# Patient Record
Sex: MALE | Race: WHITE | Employment: OTHER | RURAL
[De-identification: names, ages, dates, MRNs, and addresses within clinical notes are randomized per-mention and may not be internally consistent; named-entity substitution may affect disease eponyms.]

---

## 2017-01-01 ENCOUNTER — OFFICE VISIT (OUTPATIENT)
Dept: FAMILY MEDICINE CLINIC | Age: 82
End: 2017-01-01

## 2017-01-01 ENCOUNTER — TELEPHONE (OUTPATIENT)
Dept: FAMILY MEDICINE CLINIC | Age: 82
End: 2017-01-01

## 2017-01-01 VITALS
BODY MASS INDEX: 23.96 KG/M2 | DIASTOLIC BLOOD PRESSURE: 70 MMHG | HEART RATE: 73 BPM | OXYGEN SATURATION: 95 % | SYSTOLIC BLOOD PRESSURE: 146 MMHG | RESPIRATION RATE: 20 BRPM | WEIGHT: 167 LBS

## 2017-01-01 VITALS
DIASTOLIC BLOOD PRESSURE: 60 MMHG | HEART RATE: 74 BPM | SYSTOLIC BLOOD PRESSURE: 142 MMHG | OXYGEN SATURATION: 94 % | RESPIRATION RATE: 16 BRPM

## 2017-01-01 VITALS
WEIGHT: 170 LBS | DIASTOLIC BLOOD PRESSURE: 78 MMHG | OXYGEN SATURATION: 96 % | SYSTOLIC BLOOD PRESSURE: 138 MMHG | RESPIRATION RATE: 16 BRPM | BODY MASS INDEX: 24.39 KG/M2 | HEART RATE: 60 BPM

## 2017-01-01 VITALS
OXYGEN SATURATION: 95 % | BODY MASS INDEX: 25.97 KG/M2 | RESPIRATION RATE: 20 BRPM | WEIGHT: 181 LBS | SYSTOLIC BLOOD PRESSURE: 142 MMHG | DIASTOLIC BLOOD PRESSURE: 70 MMHG | HEART RATE: 55 BPM

## 2017-01-01 VITALS
SYSTOLIC BLOOD PRESSURE: 130 MMHG | OXYGEN SATURATION: 96 % | HEART RATE: 134 BPM | BODY MASS INDEX: 26.03 KG/M2 | RESPIRATION RATE: 20 BRPM | DIASTOLIC BLOOD PRESSURE: 78 MMHG | WEIGHT: 181.4 LBS

## 2017-01-01 VITALS
DIASTOLIC BLOOD PRESSURE: 64 MMHG | HEART RATE: 94 BPM | SYSTOLIC BLOOD PRESSURE: 118 MMHG | OXYGEN SATURATION: 98 % | RESPIRATION RATE: 16 BRPM

## 2017-01-01 VITALS
HEART RATE: 86 BPM | OXYGEN SATURATION: 98 % | DIASTOLIC BLOOD PRESSURE: 86 MMHG | SYSTOLIC BLOOD PRESSURE: 132 MMHG | RESPIRATION RATE: 19 BRPM

## 2017-01-01 DIAGNOSIS — I48.20 CHRONIC ATRIAL FIBRILLATION (HCC): Primary | ICD-10-CM

## 2017-01-01 DIAGNOSIS — L57.0 ACTINIC KERATOSIS: ICD-10-CM

## 2017-01-01 DIAGNOSIS — Z23 ENCOUNTER FOR IMMUNIZATION: ICD-10-CM

## 2017-01-01 DIAGNOSIS — I10 ESSENTIAL HYPERTENSION: ICD-10-CM

## 2017-01-01 DIAGNOSIS — G71.00 MUSCULAR DYSTROPHY (HCC): ICD-10-CM

## 2017-01-01 DIAGNOSIS — R06.89 HYPERCAPNIA: ICD-10-CM

## 2017-01-01 DIAGNOSIS — Z00.00 MEDICARE ANNUAL WELLNESS VISIT, SUBSEQUENT: Primary | ICD-10-CM

## 2017-01-01 DIAGNOSIS — I48.20 CHRONIC ATRIAL FIBRILLATION (HCC): ICD-10-CM

## 2017-01-01 LAB
INR BLD: 2.2
INR BLD: 2.8
INR BLD: 3
INR BLD: 3.9
INR BLD: 4.2
INR BLD: 4.9
PT POC: 26.1 SECONDS
PT POC: 33.6 SECONDS
PT POC: 36.2 SEC
PT POC: 46.6 SECONDS
PT POC: 50.6 SEC
PT POC: 58.3 SECONDS
VALID INTERNAL CONTROL?: YES

## 2017-01-01 RX ORDER — IPRATROPIUM BROMIDE 42 UG/1
2 SPRAY, METERED NASAL 4 TIMES DAILY
Qty: 15 ML | Refills: 24 | Status: SHIPPED | OUTPATIENT
Start: 2017-01-01

## 2017-01-01 RX ORDER — LANOLIN ALCOHOL/MO/W.PET/CERES
1000 CREAM (GRAM) TOPICAL DAILY
COMMUNITY

## 2017-01-01 RX ORDER — VERAPAMIL HYDROCHLORIDE 180 MG/1
180 TABLET, EXTENDED RELEASE ORAL DAILY
Qty: 90 TAB | Refills: 3 | Status: SHIPPED | OUTPATIENT
Start: 2017-01-01

## 2017-01-01 RX ORDER — IPRATROPIUM BROMIDE 42 UG/1
2 SPRAY, METERED NASAL 4 TIMES DAILY
Qty: 15 ML | Refills: 11 | Status: SHIPPED | OUTPATIENT
Start: 2017-01-01 | End: 2017-01-01 | Stop reason: SDUPTHER

## 2017-01-12 ENCOUNTER — TELEPHONE (OUTPATIENT)
Dept: FAMILY MEDICINE CLINIC | Age: 82
End: 2017-01-12

## 2017-03-10 NOTE — ACP (ADVANCE CARE PLANNING)
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 He has an AD and a LW. In the event that he is unable to speak for himself, he requests that we contact [address] [place] [address] Yuki at [phone].       Ryann Jackman

## 2017-03-10 NOTE — PROGRESS NOTES
Chief Complaint   Patient presents with    Annual Wellness Visit    Anticoagulation     INR Check up     Knee Pain     left knee pain wants to discuss. HPI:      Ac Chen is a 80 y.o. male. Jaz Ferrer is retired from the [employer] at [employer]. He is  and has 2 adult children. 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 He also has chronic atrial fibrillation and is rate controlled with Verapamil and anticoagulated with Warfarin. New Issues:  Due for SAWV    No Known Allergies    Current Outpatient Prescriptions   Medication Sig    azithromycin (ZITHROMAX) 250 mg tablet Take 1 Tab by mouth daily.  furosemide (LASIX) 40 mg tablet Take 1 Tab by mouth every Monday, Wednesday, Friday.  potassium chloride (K-DUR, KLOR-CON) 10 mEq tablet Take 1 Tab by mouth every Monday, Wednesday, Friday.  ipratropium (ATROVENT) 0.06 % nasal spray USE TWO SPRAY(S) IN EACH NOSTRIL 4 TIMES DAILY    verapamil ER (CALAN-SR) 180 mg CR tablet Take 180 mg by mouth daily.  warfarin (COUMADIN) 2.5 mg tablet Take 2.5 mg by mouth six (6) days a week. Indications: 1/2 tablet on Sunday     No current facility-administered medications for this visit. Past Medical History:   Diagnosis Date    Atrial fibrillation (HCC)     CO2 narcosis     uses CPAP    Hypercapnia     Hypertension     LBBB (left bundle branch block)     Macular degeneration     Muscular dystrophy (HCC)     limb-girdle    Pernicious anemia     B-12 def         ROS:  Denies fever, chills, cough, chest pain, SOB,  nausea, vomiting, or diarrhea. Denies wt loss, wt gain, hemoptysis, hematochezia or melena.     Physical Examination:    Visit Vitals    /70 (BP 1 Location: Right arm, BP Patient Position: Sitting)    Pulse (!) 55    Resp 20    Wt 181 lb (82.1 kg)    SpO2 95%    BMI 25.97 kg/m2     General: Alert and Ox3, Breathless, low volume speech, halting (this is chronic)  HEENT:  NC/AT, EOMI, OP: clear  Neck:  Supple, no adenopathy, JVD, mass or bruit  Chest:  Clear to Ausculation, without wheezes, rales, rubs or ronchi  Cardiac: IRRR  Abdomen:  +BS, soft, nontender without palpable HSM  Extremities:  No cyanosis, clubbing or edema  Neurologic:  Ambulatory without assist, CN 2-12 grossly intact. Moves all extremities. Skin: no rash  Lymphadenopathy: no cervical or supraclavicular nodes    Lab Results   Component Value Date/Time    INR, External 1.8 06/05/2015 10:28 AM    INR POC 4.2 03/10/2017 10:54 AM    INR POC 2.9 12/28/2016 10:13 AM    INR POC 3.1 10/21/2016 11:13 AM         ASSESSMENT AND PLAN:     1.  AF:  supratherapeutically anticoagulated. Will hold warfarin for 2 days and then resume and he will check at home next week. New warfarin dose:  12 mg per WEEK. 2.  Due for Wellness. 3.  RTC in 2 weeks    Orders Placed This Encounter    COLLECTION CAPILLARY BLOOD SPECIMEN    AMB POC PT/INR       [place] MD, FACP        ______________________________________________________________________    Vero Fisher is a 80 y.o. male and presents for annual Medicare Wellness Visit. Problem List: Reviewed with patient and discussed risk factors. Patient Active Problem List   Diagnosis Code    Atrial fibrillation (HCC) I48.91    Hypercapnia R06.89    CO2 narcosis R06.89    Muscular dystrophy ([facility] [address] G71.0    Macular degeneration H35.30    Pernicious anemia D51.0    Hypertension I10    Primary osteoarthritis of left knee M17.12       Current medical providers:  Patient Care Team:  Yessica Patricio MD as PCP - General (Internal Medicine)    PM, , Medications/Allergies: reviewed, on chart. Male Alcohol Screening: On any occasion during the past 3 months, have you had more than 4 drinks containing alcohol? No    Do you average more than 14 drinks per week?   No    ROS:  Constitutional: No fever, chills or weight loss  Respiratory: No cough, SOB   CV: No chest pain or Palpitations    Objective:  Visit Vitals    /70 (BP 1 Location: Right arm, BP Patient Position: Sitting)    Pulse (!) 55    Resp 20    Wt 181 lb (82.1 kg)    SpO2 95%    BMI 25.97 kg/m2    Body mass index is 25.97 kg/(m^2). Assessment of cognitive impairment: Alert and oriented x 3    Depression Screen:   PHQ 2 / 9, over the last two weeks 3/10/2017   Little interest or pleasure in doing things Not at all   Feeling down, depressed or hopeless Not at all   Total Score PHQ 2 0       Fall Risk Assessment:    Fall Risk Assessment, last 12 mths 3/10/2017   Able to walk? Yes   Fall in past 12 months? No       Functional Ability:   Does the patient exhibit a steady gait? yes   How long did it take the patient to get up and walk from a sitting position? 12 sec   Is the patient self reliant?  (ie can do own laundry, meals, household chores)  yes     Does the patient handle his/her own medications? yes     Does the patient handle his/her own money? yes     Is the patients home safe (ie good lighting, handrails on stairs and bath, etc.)? yes     Did you notice or did patient express any hearing difficulties? yes     Did you notice or did patient express any vision difficulties? no       Advance Care Planning:   Patient was offered the opportunity to discuss advance care planning:  yes     Does patient have an Advance Directive:  yes   If no, did you provide information on Caring Connections?  no       Plan:      Orders Placed This Encounter    COLLECTION CAPILLARY BLOOD SPECIMEN    AMB POC PT/INR       Health Maintenance   Topic Date Due    DTaP/Tdap/Td series (1 - Tdap) 06/08/1956    GLAUCOMA SCREENING Q2Y  06/08/2000    MEDICARE YEARLY EXAM  01/26/2017    ZOSTER VACCINE AGE 60>  Completed    Pneumococcal 65+ Low/Medium Risk  Completed    INFLUENZA AGE 9 TO ADULT  Completed       *Patient verbalized understanding and agreement with the plan.   A copy of the After Visit Summary with personalized health plan was given to the patient today.

## 2017-03-10 NOTE — MR AVS SNAPSHOT
Visit Information Date & Time Provider Department Dept. Phone Encounter #  
 3/10/2017 10:30 AM Alessandra Ny MD [facility] [id number] Follow-up Instructions Return if symptoms worsen or fail to improve. Upcoming Health Maintenance Date Due DTaP/Tdap/Td series (1 - Tdap) 6[birth year]8/1956 GLAUCOMA SCREENING Q2Y 6/8/2000 MEDICARE YEARLY EXAM 1/26/2017 Allergies as of 3/10/2017  Review Complete On: 3/10/2017 By: Alessandra Ny MD  
 No Known Allergies Current Immunizations  Never Reviewed Name Date Influenza High Dose Vaccine PF 10/21/2016 11:39 AM  
 Influenza Vaccine 11/20/2015 10:33 AM, 10/22/2014 Pneumococcal Conjugate (PCV-13) 1/26/2016 10:37 AM  
 Pneumococcal Vaccine (Unspecified Type) 5/5/2010 Varicella Virus Vaccine 4/25/2012 Not reviewed this visit You Were Diagnosed With   
  
 Codes Comments Medicare annual wellness visit, subsequent    -  Primary ICD-10-CM: Z00.00 ICD-9-CM: V70.0 Chronic atrial fibrillation (HCC)     ICD-10-CM: F47.8 ICD-9-CM: 427.31 Muscular dystrophy ([facility] [address]     ICD-10-CM: G71.0 ICD-9-CM: 359.1 Vitals BP Pulse Resp Weight(growth percentile) SpO2 BMI  
 142/70 (BP 1 Location: Right arm, BP Patient Position: Sitting) (!) 55 20 181 lb (82.1 kg) 95% 25.97 kg/m2 Smoking Status Former Smoker Vitals History BMI and BSA Data Body Mass Index Body Surface Area  
 25.97 kg/m 2 2.01 m 2 Preferred Pharmacy Pharmacy Name Phone [facility] [address] [phone] Your Updated Medication List  
  
   
This list is accurate as of: 3/10/17 11:30 AM.  Always use your most recent med list.  
  
  
  
  
 azithromycin 250 mg tablet Commonly known as:  Unice Abigail Take 1 Tab by mouth daily. furosemide 40 mg tablet Commonly known as:  LASIX Take 1 Tab by mouth every Monday, Wednesday, Friday. ipratropium 0.06 % nasal spray Commonly known as:  ATROVENT  
USE TWO SPRAY(S) IN EACH NOSTRIL 4 TIMES DAILY potassium chloride 10 mEq tablet Commonly known as:  K-DUR, KLOR-CON Take 1 Tab by mouth every Monday, Wednesday, Friday. verapamil  mg CR tablet Commonly known as:  CALAN-SR Take 180 mg by mouth daily. warfarin 2.5 mg tablet Commonly known as:  COUMADIN Take 2.5 mg by mouth six (6) days a week. Indications: 1/2 tablet on Sunday March 2017 Details Sun Mon Tue Wed Thu Fri Sat  
     1  
  
  
  
   2  
  
  
  
   3  
  
  
  
   4  
  
  
  
  
  5  
  
  
  
   6  
  
  
  
   7  
  
  
  
   8  
  
  
  
   9  
  
  
  
   10 4.2 See details 11  
  
  
  
  
  12  
  
  
  
   13  
  
  
  
   14  
  
  
  
   15  
  
  
  
   16  
  
  
  
   17  
  
  
  
   18  
  
  
  
  
  19  
  
  
  
   20  
  
  
  
   21  
  
  
  
   22  
  
  
  
   23  
  
  
  
   24  
  
  
  
   25  
  
  
  
  
  26  
  
  
  
   27  
  
  
  
   28  
  
  
  
   29  
  
  
  
   30  
  
  
  
   31  
  
  
  
   
 Date Details 03/10 This INR check INR: 4.2 Date of next INR: No date specified We Performed the Following AMB POC PT/INR [46262 CPT(R)] COLLECTION CAPILLARY BLOOD SPECIMEN [76624 CPT(R)] Follow-up Instructions Return if symptoms worsen or fail to improve. Introducing [address]! Kenya Blanca introduces [employer] patient portal. Now you can access parts of your medical record, email your doctor's office, and request medication refills online. 1. In your internet browser, go to [email or web address] 2. Click on the First Time User? Click Here link in the Sign In box. You will see the New Member Sign Up page. 3. Enter your LiveData Access Code exactly as it appears below.  You will not need to use this code after youve completed the sign-up process. If you do not sign up before the expiration date, you must request a new code. · [employer] Access Code: [email or web address] Expires: 6/8/2017 10:57 AM 
 
4. Enter the last four digits of your Social Security Number (xxxx) and Date of Birth (mm/dd/yyyy) as indicated and click Submit. You will be taken to the next sign-up page. 5. Create a Bridg ID. This will be your Bridg login ID and cannot be changed, so think of one that is secure and easy to remember. 6. Create a Bridg password. You can change your password at any time. 7. Enter your Password Reset Question and Answer. This can be used at a later time if you forget your password. 8. Enter your e-mail address. You will receive e-mail notification when new information is available in [address] 9. Click Sign Up. You can now view and download portions of your medical record. 10. Click the Download Summary menu link to download a portable copy of your medical information. If you have questions, please visit the Frequently Asked Questions section of the Bridg website. Remember, Bridg is NOT to be used for urgent needs. For medical emergencies, dial 911. Now available from your iPhone and Android! Please provide this summary of care documentation to your next provider. Your primary care clinician is listed as Tiffanie Crisostomo. If you have any questions after today's visit, please call [phone].

## 2017-03-10 NOTE — PROGRESS NOTES
Results for orders placed or performed in visit on 03/10/17   AMB POC PT/INR   Result Value Ref Range    VALID INTERNAL CONTROL POC Yes     Prothrombin time (POC) 50.6 sec    INR POC 4.2

## 2017-03-31 NOTE — PROGRESS NOTES
Chief Complaint   Patient presents with    Anticoagulation     INR check         HPI:       is a 80 y.o. male. Abena Gold is retired from the [employer] at [employer]. He is  and has 2 adult children. 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 He also has chronic atrial fibrillation and is rate controlled with Verapamil and anticoagulated with Warfarin. New Issues: Too thin last visit;  Due to have INR assessment today    No Known Allergies    Current Outpatient Prescriptions   Medication Sig    BETA-CAROTENE,A,-VITS C,E/MINS (VISION PO) Take  by mouth.  furosemide (LASIX) 40 mg tablet Take 1 Tab by mouth every Monday, Wednesday, Friday.  ipratropium (ATROVENT) 0.06 % nasal spray USE TWO SPRAY(S) IN EACH NOSTRIL 4 TIMES DAILY    verapamil ER (CALAN-SR) 180 mg CR tablet Take 180 mg by mouth daily.  warfarin (COUMADIN) 2.5 mg tablet Take 2.5 mg by mouth six (6) days a week. Indications: 1/2 tablet on Sunday     No current facility-administered medications for this visit. Past Medical History:   Diagnosis Date    Atrial fibrillation (HCC)     CO2 narcosis     uses CPAP    Hypercapnia     Hypertension     LBBB (left bundle branch block)     Macular degeneration     Muscular dystrophy (HCC)     limb-girdle    Pernicious anemia     B-12 def         ROS:  Denies fever, chills, cough, chest pain, SOB,  nausea, vomiting, or diarrhea. Denies wt loss, wt gain, hemoptysis, hematochezia or melena.     Physical Examination:    /78 (BP 1 Location: Right arm, BP Patient Position: Sitting)  Pulse (!) 134  Resp 20  Wt 181 lb 6.4 oz (82.3 kg)  SpO2 96%  BMI 26.03 kg/m2    General: Alert and Ox3, Breathless, low volume speech, halting (this is chronic)  HEENT: NC/AT, EOMI, OP: clear  Neck: Supple, no adenopathy, JVD, mass or bruit  Chest: Clear to Ausculation, without wheezes, rales, rubs or ronchi  Cardiac: IRRR  Abdomen: +BS, soft, nontender without palpable HSM  Extremities: No cyanosis, clubbing or edema  Neurologic: Ambulatory with a walker, CN 2-12 grossly intact. Moves all extremities. Skin: no rash  Lymphadenopathy: no cervical or supraclavicular nodes    Lab Results   Component Value Date/Time    INR, External 1.8 06/05/2015 10:28 AM    INR POC 3.0 03/31/2017 09:34 AM    INR POC 4.2 03/10/2017 10:54 AM    INR POC 2.9 12/28/2016 10:13 AM       ASSESSMENT AND PLAN:     1. He is therapeutically anticoagulated. No changes in Warfarin regimen. Currently taking Warfarin 2.5 mg MTFS, 1.25 mg WR and none on Sunday. 2.  CO2 narcosis: no recent sx  3. Well controlled HTN    Orders Placed This Encounter    COLLECTION CAPILLARY BLOOD SPECIMEN    AMB POC PT/INR    BETA-CAROTENE,A,-VITS C,E/MINS (VISION PO)     Sig: Take  by mouth.        Sarah Painting MD, [place]

## 2017-03-31 NOTE — MR AVS SNAPSHOT
Visit Information Date & Time Provider Department Dept. Phone Encounter #  
 3/31/2017  9:30 AM Sean Valdovinos MD [facility] [id number] Follow-up Instructions Return in about 2 months (around 5/31/2017). Follow-up and Disposition History Upcoming Health Maintenance Date Due DTaP/Tdap/Td series (1 - Tdap) 6[birth year]8/1956 GLAUCOMA SCREENING Q2Y 6/8/2000 MEDICARE YEARLY EXAM 3/11/2018 Allergies as of 3/31/2017  Review Complete On: 3/10/2017 By: Sean Valdovinos MD  
 No Known Allergies Current Immunizations  Never Reviewed Name Date Influenza High Dose Vaccine PF 10/21/2016 11:39 AM  
 Influenza Vaccine 11/20/2015 10:33 AM, 10/22/2014 Pneumococcal Conjugate (PCV-13) 1/26/2016 10:37 AM  
 Pneumococcal Vaccine (Unspecified Type) 5/5/2010 Varicella Virus Vaccine 4/25/2012 Not reviewed this visit You Were Diagnosed With   
  
 Codes Comments Chronic atrial fibrillation (HCC)    -  Primary ICD-10-CM: B03.7 ICD-9-CM: 427.31 Hypercapnia     ICD-10-CM: R06.89 
ICD-9-CM: 786.09 Essential hypertension     ICD-10-CM: I10 
ICD-9-CM: 401.9 Vitals BP Pulse Resp Weight(growth percentile) SpO2 BMI  
 130/78 (BP 1 Location: Right arm, BP Patient Position: Sitting) (!) 134 20 181 lb 6.4 oz (82.3 kg) 96% 26.03 kg/m2 Smoking Status Former Smoker Vitals History BMI and BSA Data Body Mass Index Body Surface Area 26.03 kg/m 2 2.02 m 2 Preferred Pharmacy Pharmacy Name Phone [facility] [place] [address] [phone] Your Updated Medication List  
  
   
This list is accurate as of: 3/31/17 10:03 AM.  Always use your most recent med list.  
  
  
  
  
 furosemide 40 mg tablet Commonly known as:  LASIX Take 1 Tab by mouth every Monday, Wednesday, Friday. ipratropium 0.06 % nasal spray Commonly known as:  ATROVENT  
USE TWO SPRAY(S) IN EACH NOSTRIL 4 TIMES DAILY  
  
 verapamil  mg CR tablet Commonly known as:  CALAN-SR Take 180 mg by mouth daily. VISION PO Take  by mouth.  
  
 warfarin 2.5 mg tablet Commonly known as:  COUMADIN Take 2.5 mg by mouth six (6) days a week. Indications: 1/2 tablet on Sunday We Performed the Following AMB POC PT/INR [55010 CPT(R)] COLLECTION CAPILLARY BLOOD SPECIMEN [64230 CPT(R)] Follow-up Instructions Return in about 2 months (around 5/31/2017). Introducing [facility]! Grisel Sethi introduces [employer] patient portal. Now you can access parts of your medical record, email your doctor's office, and request medication refills online. 1. In your internet browser, go to [email or web address] 2. Click on the First Time User? Click Here link in the Sign In box. You will see the New Member Sign Up page. 3. Enter your [employer] Access Code exactly as it appears below. You will not need to use this code after youve completed the sign-up process. If you do not sign up before the expiration date, you must request a new code. · [employer] Access Code: [email or web address] Expires: 6/8/2017 11:57 AM 
 
4. Enter the last four digits of your Social Security Number (xxxx) and Date of Birth (mm/dd/yyyy) as indicated and click Submit. You will be taken to the next sign-up page. 5. Create a Porcht ID. This will be your [employer] login ID and cannot be changed, so think of one that is secure and easy to remember. 6. Create a [employer] password. You can change your password at any time. 7. Enter your Password Reset Question and Answer. This can be used at a later time if you forget your password. 8. Enter your e-mail address. You will receive e-mail notification when new information is available in [address] 9. Click Sign Up. You can now view and download portions of your medical record. 10. Click the Download Summary menu link to download a portable copy of your medical information. If you have questions, please visit the Frequently Asked Questions section of the [employer] website. Remember, [employer] is NOT to be used for urgent needs. For medical emergencies, dial 911. Now available from your iPhone and Android! Please provide this summary of care documentation to your next provider. Your primary care clinician is listed as Blanquita Hicks. If you have any questions after today's visit, please call [phone].

## 2017-06-02 NOTE — PROGRESS NOTES
Chief Complaint   Patient presents with    Irregular Heart Beat         HPI:       is a 80 y.o. male. Kristina Tafoya is retired from the [employer] at [employer]. He is  and has 2 adult children. 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 He also has chronic atrial fibrillation and is rate controlled with Verapamil and anticoagulated with Warfarin. Recently has taken probiotics. No new meds, antibiotics, ETOH, or med deletions. No bleeding. No Known Allergies    Current Outpatient Prescriptions   Medication Sig    cyanocobalamin (VITAMIN B-12) 1,000 mcg tablet Take 1,000 mcg by mouth daily.  B.infantis-B.ani-B.long-B.bifi (PROBIOTIC 4X) 10-15 mg TbEC Take  by mouth.  ipratropium (ATROVENT) 0.06 % nasal spray USE TWO SPRAY(S) IN EACH NOSTRIL 4 TIMES DAILY    BETA-CAROTENE,A,-VITS C,E/MINS (VISION PO) Take  by mouth.  furosemide (LASIX) 40 mg tablet Take 1 Tab by mouth every Monday, Wednesday, Friday.  verapamil ER (CALAN-SR) 180 mg CR tablet Take 180 mg by mouth daily.  warfarin (COUMADIN) 2.5 mg tablet Take 2.5 mg by mouth six (6) days a week. Indications: 1/2 tablet on Sunday     No current facility-administered medications for this visit. Past Medical History:   Diagnosis Date    Atrial fibrillation (HCC)     CO2 narcosis     uses CPAP    Hypercapnia     Hypertension     LBBB (left bundle branch block)     Macular degeneration     Muscular dystrophy (HCC)     limb-girdle    Pernicious anemia     B-12 def         ROS:  Denies fever, chills, cough, chest pain, SOB,  nausea, vomiting, or diarrhea. Denies wt loss, wt gain, hemoptysis, hematochezia or melena.     Physical Examination:    /78 (BP 1 Location: Left arm, BP Patient Position: Sitting)  Pulse 60  Resp 16  Wt 170 lb (77.1 kg)  SpO2 96%  BMI 24.39 kg/m2    General: Alert and Ox3, Fluent speech  HEENT:  NC/AT, EOMI, OP: clear  Neck:  Supple, no adenopathy, JVD, mass or bruit  Chest:  Clear to Ausculation, without wheezes, rales, rubs or ronchi  Cardiac: IRRR  Abdomen:  +BS, soft, nontender without palpable HSM  Extremities:  No cyanosis, clubbing or edema  Neurologic:  Ambulatory without assist, CN 2-12 grossly intact. Moves all extremities. Skin: no rash  Lymphadenopathy: no cervical or supraclavicular nodes    INR:  4.9    ASSESSMENT AND PLAN:     1.  AF:  Too thin. No Warfarin for the next 4 days. RTC Tuesday am for recheck. Stop probiotics. Orders Placed This Encounter    COLLECTION CAPILLARY BLOOD SPECIMEN    AMB POC PT/INR    cyanocobalamin (VITAMIN B-12) 1,000 mcg tablet     Sig: Take 1,000 mcg by mouth daily.  B.infantis-B.ani-B.long-B.bifi (PROBIOTIC 4X) 10-15 mg TbEC     Sig: Take  by mouth.        Gideon Freedman MD, [place]

## 2017-06-06 NOTE — PROGRESS NOTES
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 He is  and has 2 adult children. 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 He also has chronic atrial fibrillation and is rate controlled with Verapamil and anticoagulated with Warfarin. Seen last week with an unusually high INR and his warfarin was held for 5 days. Only new change was recent use of probiotics. No Known Allergies    Current Outpatient Prescriptions   Medication Sig    cyanocobalamin (VITAMIN B-12) 1,000 mcg tablet Take 1,000 mcg by mouth daily.  B.infantis-B.ani-B.long-B.bifi (PROBIOTIC 4X) 10-15 mg TbEC Take  by mouth.  BETA-CAROTENE,A,-VITS C,E/MINS (VISION PO) Take  by mouth.  furosemide (LASIX) 40 mg tablet Take 1 Tab by mouth every Monday, Wednesday, Friday.  ipratropium (ATROVENT) 0.06 % nasal spray USE TWO SPRAY(S) IN EACH NOSTRIL 4 TIMES DAILY    verapamil ER (CALAN-SR) 180 mg CR tablet Take 180 mg by mouth daily.  warfarin (COUMADIN) 2.5 mg tablet Take 2.5 mg by mouth six (6) days a week. Indications: 1/2 tablet on Sunday     No current facility-administered medications for this visit. Past Medical History:   Diagnosis Date    Atrial fibrillation (HCC)     CO2 narcosis     uses CPAP    Hypercapnia     Hypertension     LBBB (left bundle branch block)     Macular degeneration     Muscular dystrophy (HCC)     limb-girdle    Pernicious anemia     B-12 def         ROS:  Denies fever, chills, cough, chest pain, SOB,  nausea, vomiting, or diarrhea. Denies wt loss, wt gain, hemoptysis, hematochezia or melena.     Physical Examination:    /64 (BP 1 Location: Left arm, BP Patient Position: Sitting)  Pulse 94  Resp 16  SpO2 98%    General: Alert and Ox3, Fluent speech; hoarse (chronic)  HEENT:  NC/AT, EOMI, OP: clear  Neck:  Supple, no adenopathy, JVD, mass or bruit  Chest:  Clear to Ausculation, without wheezes, rales, rubs or ronchi  Cardiac: IRRR  Abdomen:  +BS, soft, nontender without palpable HSM  Extremities:  No cyanosis, clubbing or edema  Neurologic:  Ambulatory with walker, CN 2-12 grossly intact. Moves all extremities. Skin: no rash  Lymphadenopathy: no cervical or supraclavicular nodes    INR 2.2    ASSESSMENT AND PLAN:     1.  AF:  Resume warfarin at 2.5 mg daily for 5 days and no warfarin for 2 days and reassess in 2 weeks.     Orders Placed This Encounter    COLLECTION CAPILLARY BLOOD SPECIMEN    AMB POC PT/INR       Jamie Abarca MD, Kelvin Balderass

## 2017-06-06 NOTE — MR AVS SNAPSHOT
Visit Information Date & Time Provider Department Dept. Phone Encounter #  
 6/6/2017  9:30 AM [place] [address] [id number] Upcoming Health Maintenance Date Due INFLUENZA AGE 9 TO ADULT 8/1/2017 MEDICARE YEARLY EXAM 3/11/2018 GLAUCOMA SCREENING Q2Y 4/17/2019 DTaP/Tdap/Td series (2 - Td) 6/2/2027 Allergies as of 6/6/2017  Review Complete On: 6/6/2017 By: Chantelle Stuart MD  
 No Known Allergies Current Immunizations  Reviewed on 6/6/2017 Name Date Influenza High Dose Vaccine PF 10/21/2016 11:39 AM  
 Influenza Vaccine 11/20/2015 10:33 AM, 10/22/2014 Pneumococcal Conjugate (PCV-13) 1/26/2016 10:37 AM  
 Pneumococcal Vaccine (Unspecified Type) 5/5/2010 Varicella Virus Vaccine 4/25/2012 Reviewed by Israel Fuentes on 6/6/2017 at  9:31 AM  
You Were Diagnosed With   
  
 Codes Comments Chronic atrial fibrillation (HCC)    -  Primary ICD-10-CM: N33.8 ICD-9-CM: 427.31 Vitals BP Pulse Resp SpO2 Smoking Status 118/64 (BP 1 Location: Left arm, BP Patient Position: Sitting) 94 16 98% Former Smoker Preferred Pharmacy Pharmacy Name Phone [facility] [place] [address] [address] [phone] Your Updated Medication List  
  
   
This list is accurate as of: 6/6/17 10:07 AM.  Always use your most recent med list.  
  
  
  
  
 furosemide 40 mg tablet Commonly known as:  LASIX Take 1 Tab by mouth every Monday, Wednesday, Friday. ipratropium 0.06 % nasal spray Commonly known as:  ATROVENT  
USE TWO SPRAY(S) IN EACH NOSTRIL 4 TIMES DAILY PROBIOTIC 4X 10-15 mg Tbec Generic drug:  B.infantis-B.ani-B.long-B.bifi Take  by mouth.  
  
 verapamil  mg CR tablet Commonly known as:  CALAN-SR Take 180 mg by mouth daily. VISION PO Take  by mouth. VITAMIN B-12 1,000 mcg tablet Generic drug:  cyanocobalamin Take 1,000 mcg by mouth daily. warfarin 2.5 mg tablet Commonly known as:  COUMADIN Take 2.5 mg by mouth six (6) days a week. Indications: 1/2 tablet on Sunday We Performed the Following AMB POC PT/INR [05840 CPT(R)] COLLECTION CAPILLARY BLOOD SPECIMEN [18785 CPT(R)] Patient Instructions If you have any questions regarding [employer], you may call [employer] support at [phone]. Introducing [facility] & HE[facility] SERVICES! Jose David Phipps introduces [employer] patient portal. Now you can access parts of your medical record, email your doctor's office, and request medication refills online. 1. In your internet browser, go to [email or web address] 2. Click on the First Time User? Click Here link in the Sign In box. You will see the New Member Sign Up page. 3. Enter your [employer] Access Code exactly as it appears below. You will not need to use this code after youve completed the sign-up process. If you do not sign up before the expiration date, you must request a new code. · [employer] Access Code: [email or web address] Expires: 6/8/2017 11:57 AM 
 
4. Enter the last four digits of your Social Security Number (xxxx) and Date of Birth (mm/dd/yyyy) as indicated and click Submit. You will be taken to the next sign-up page. 5. Create a [employer] ID. This will be your [employer] login ID and cannot be changed, so think of one that is secure and easy to remember. 6. Create a [employer] password. You can change your password at any time. 7. Enter your Password Reset Question and Answer. This can be used at a later time if you forget your password. 8. Enter your e-mail address. You will receive e-mail notification when new information is available in [address] 9. Click Sign Up. You can now view and download portions of your medical record. 10. Click the Download Summary menu link to download a portable copy of your medical information. If you have questions, please visit the Frequently Asked Questions section of the [email or web address] website. Remember, [employer] is NOT to be used for urgent needs. For medical emergencies, dial 911. Now available from your iPhone and Android! Please provide this summary of care documentation to your next provider. Your primary care clinician is listed as Kerri Moralez. If you have any questions after today's visit, please call [phone].

## 2017-06-20 NOTE — PROGRESS NOTES
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 He is  and has 2 adult children. 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 He also has chronic atrial fibrillation and is rate controlled with Verapamil and anticoagulated with Warfarin. Seen last week with an unusually high INR and his warfarin was adjusted to 12.5 mg per WEEK. No Known Allergies    Current Outpatient Prescriptions   Medication Sig    cyanocobalamin (VITAMIN B-12) 1,000 mcg tablet Take 1,000 mcg by mouth daily.  B.infantis-B.ani-B.long-B.bifi (PROBIOTIC 4X) 10-15 mg TbEC Take  by mouth.  BETA-CAROTENE,A,-VITS C,E/MINS (VISION PO) Take  by mouth.  furosemide (LASIX) 40 mg tablet Take 1 Tab by mouth every Monday, Wednesday, Friday.  verapamil ER (CALAN-SR) 180 mg CR tablet Take 180 mg by mouth daily.  ipratropium (ATROVENT) 0.06 % nasal spray 2 Sprays by Both Nostrils route four (4) times daily.  warfarin (COUMADIN) 2.5 mg tablet Take 2.5 mg by mouth five (5) days a week. Indications: Non on Wed and Sunday     No current facility-administered medications for this visit. Past Medical History:   Diagnosis Date    Atrial fibrillation (HCC)     CO2 narcosis     uses CPAP    Hypercapnia     Hypertension     LBBB (left bundle branch block)     Macular degeneration     Muscular dystrophy (HCC)     limb-girdle    Pernicious anemia     B-12 def         ROS:  Denies fever, chills, cough, chest pain, SOB,  nausea, vomiting, or diarrhea. Denies wt loss, wt gain, hemoptysis, hematochezia or melena.     Physical Examination:    /60 (BP 1 Location: Left arm, BP Patient Position: Sitting)  Pulse 74  Resp 16  SpO2 94%    General: Alert and Ox3, Fluent speech; hoarse (chronic)  HEENT: NC/AT, EOMI, OP: clear  Neck: Supple, no adenopathy, JVD, mass or bruit  Chest: Clear to Ausculation, without wheezes, rales, rubs or ronchi  Cardiac: IRRR  Abdomen: +BS, soft, nontender without palpable HSM  Extremities: No cyanosis, clubbing or edema  Neurologic: Ambulatory with walker, CN 2-12 grossly intact. Moves all extremities. Skin: no rash  Lymphadenopathy: no cervical or supraclavicular nodes    Lab Results   Component Value Date/Time    INR, External 1.8 06/05/2015 10:28 AM    INR POC 3.9 06/20/2017 10:45 AM    INR POC 2.2 06/06/2017 09:31 AM    INR POC 4.9 06/02/2017 09:56 AM       ASSESSMENT AND PLAN:     1. INR is too thin:  Decrease Warfarin to 10 mg per WEEK. He will be checking this again in 3 weeks.   Warfarin 2.5 mg MWF and Sat    Orders Placed This Encounter    COLLECTION CAPILLARY BLOOD SPECIMEN    AMB POC PT/INR       Omar Bills MD, Josy Shay

## 2017-06-20 NOTE — MR AVS SNAPSHOT
Visit Information Date & Time Provider Department Dept. Phone Encounter #  
 6/20/2017 10:00 AM Dalia Sosa MD [place] [id number] Upcoming Health Maintenance Date Due INFLUENZA AGE 9 TO ADULT 8/1/2017 MEDICARE YEARLY EXAM 3/11/2018 GLAUCOMA SCREENING Q2Y 4/17/2019 DTaP/Tdap/Td series (2 - Td) 6/2/2027 Allergies as of 6/20/2017  Review Complete On: 6/20/2017 By: Dalia Sosa MD  
 No Known Allergies Current Immunizations  Reviewed on 6/6/2017 Name Date Influenza High Dose Vaccine PF 10/21/2016 11:39 AM  
 Influenza Vaccine 11/20/2015 10:33 AM, 10/22/2014 Pneumococcal Conjugate (PCV-13) 1/26/2016 10:37 AM  
 Pneumococcal Vaccine (Unspecified Type) 5/5/2010 Varicella Virus Vaccine 4/25/2012 Not reviewed this visit You Were Diagnosed With   
  
 Codes Comments Chronic atrial fibrillation (HCC)    -  Primary ICD-10-CM: C87.1 ICD-9-CM: 427.31 Vitals BP Pulse Resp SpO2 Smoking Status 142/60 (BP 1 Location: Left arm, BP Patient Position: Sitting) 74 16 94% Former Smoker Vitals History Preferred Pharmacy Pharmacy Name Phone [facility] [place] [address], [place]  [address] [phone] Your Updated Medication List  
  
   
This list is accurate as of: 6/20/17 11:14 AM.  Always use your most recent med list.  
  
  
  
  
 furosemide 40 mg tablet Commonly known as:  LASIX Take 1 Tab by mouth every Monday, Wednesday, Friday. ipratropium 0.06 % nasal spray Commonly known as:  ATROVENT  
2 Sprays by Both Nostrils route four (4) times daily. PROBIOTIC 4X 10-15 mg Tbec Generic drug:  B.infantis-B.ani-B.long-B.bifi Take  by mouth.  
  
 verapamil  mg CR tablet Commonly known as:  CALAN-SR Take 180 mg by mouth daily. VISION PO Take  by mouth. VITAMIN B-12 1,000 mcg tablet Generic drug:  cyanocobalamin Take 1,000 mcg by mouth daily. warfarin 2.5 mg tablet Commonly known as:  COUMADIN Take 2.5 mg by mouth five (5) days a week. Indications: Non on Wed and Sunday We Performed the Following AMB POC PT/INR [46608 CPT(R)] COLLECTION CAPILLARY BLOOD SPECIMEN [77359 CPT(R)] Introducing [facility]! Heaven Payne introduces [employer] patient portal. Now you can access parts of your medical record, email your doctor's office, and request medication refills online. 1. In your internet browser, go to [email or web address] 2. Click on the First Time User? Click Here link in the Sign In box. You will see the New Member Sign Up page. 3. Enter your [employer] Access Code exactly as it appears below. You will not need to use this code after youve completed the sign-up process. If you do not sign up before the expiration date, you must request a new code. · [employer] Access Code: [email or web address] Expires: 9/18/2017 11:13 AM 
 
4. Enter the last four digits of your Social Security Number (xxxx) and Date of Birth (mm/dd/yyyy) as indicated and click Submit. You will be taken to the next sign-up page. 5. Create a [employer] ID. This will be your [employer] login ID and cannot be changed, so think of one that is secure and easy to remember. 6. Create a [employer] password. You can change your password at any time. 7. Enter your Password Reset Question and Answer. This can be used at a later time if you forget your password. 8. Enter your e-mail address. You will receive e-mail notification when new information is available in [address] 9. Click Sign Up. You can now view and download portions of your medical record. 10. Click the Download Summary menu link to download a portable copy of your medical information. If you have questions, please visit the Frequently Asked Questions section of the [employer] website.  Remember, [employer] is NOT to be used for urgent needs. For medical emergencies, dial 911. Now available from your iPhone and Android! Please provide this summary of care documentation to your next provider. Your primary care clinician is listed as Meryle Chuck. If you have any questions after today's visit, please call [phone].

## 2017-07-12 NOTE — MR AVS SNAPSHOT
Visit Information Date & Time Provider Department Dept. Phone Encounter #  
 7/12/2017 10:00 AM Zofia Mcelroy MD [place] [id number] Follow-up Instructions Return in about 2 months (around 9/12/2017). Follow-up and Disposition History Upcoming Health Maintenance Date Due INFLUENZA AGE 9 TO ADULT 8/1/2017 MEDICARE YEARLY EXAM 3/11/2018 GLAUCOMA SCREENING Q2Y 4/17/2019 DTaP/Tdap/Td series (2 - Td) 6/2/2027 Allergies as of 7/12/2017  Review Complete On: 7/12/2017 By: Zofia Mcelroy MD  
 No Known Allergies Current Immunizations  Reviewed on 7/12/2017 Name Date Influenza High Dose Vaccine PF 10/21/2016 11:39 AM  
 Influenza Vaccine 11/20/2015 10:33 AM, 10/22/2014 Pneumococcal Conjugate (PCV-13) 1/26/2016 10:37 AM  
 Pneumococcal Vaccine (Unspecified Type) 5/5/2010 Varicella Virus Vaccine 4/25/2012 Reviewed by Carlo Allen on 7/12/2017 at  9:53 AM  
You Were Diagnosed With   
  
 Codes Comments Chronic atrial fibrillation (HCC)    -  Primary ICD-10-CM: J77.1 ICD-9-CM: 427.31 Vitals BP Pulse Resp SpO2 Smoking Status 132/86 (BP 1 Location: Left arm, BP Patient Position: Sitting) 86 19 98% Former Smoker Vitals History Preferred Pharmacy Pharmacy Name Phone [facility] [place] [address] [phone] Your Updated Medication List  
  
   
This list is accurate as of: 7/12/17 10:43 AM.  Always use your most recent med list.  
  
  
  
  
 furosemide 40 mg tablet Commonly known as:  LASIX Take 1 Tab by mouth every Monday, Wednesday, Friday. ipratropium 0.06 % nasal spray Commonly known as:  ATROVENT  
2 Sprays by Both Nostrils route four (4) times daily. PROBIOTIC 4X 10-15 mg Tbec Generic drug:  B.infantis-B.ani-B.long-B.bifi Take  by mouth.  
  
 rivaroxaban 20 mg Tab tablet Commonly known as:  Darlene Milo Take 1 Tab by mouth daily. verapamil  mg CR tablet Commonly known as:  CALAN-SR Take 180 mg by mouth daily. VISION PO Take  by mouth. VITAMIN B-12 1,000 mcg tablet Generic drug:  cyanocobalamin Take 1,000 mcg by mouth daily. Prescriptions Sent to Pharmacy Refills  
 rivaroxaban (XARELTO) 20 mg tab tablet 11 Sig: Take 1 Tab by mouth daily. Class: Normal  
 Pharmacy: [address] Ph #: [phone] Route: Oral  
  
We Performed the Following AMB POC PT/INR [29019 CPT(R)] COLLECTION CAPILLARY BLOOD SPECIMEN [01004 CPT(R)] Follow-up Instructions Return in about 2 months (around 9/12/2017). Introducing [facility] & HEALTH SERVICES! Jv Steel introduces [employer] patient portal. Now you can access parts of your medical record, email your doctor's office, and request medication refills online. 1. In your internet browser, go to [email or web address] 2. Click on the First Time User? Click Here link in the Sign In box. You will see the New Member Sign Up page. 3. Enter your [employer] Access Code exactly as it appears below. You will not need to use this code after youve completed the sign-up process. If you do not sign up before the expiration date, you must request a new code. · [employer] Access Code: [email or web address] Expires: 9/18/2017 11:13 AM 
 
4. Enter the last four digits of your Social Security Number (xxxx) and Date of Birth (mm/dd/yyyy) as indicated and click Submit. You will be taken to the next sign-up page. 5. Create a [employer] ID. This will be your [employer] login ID and cannot be changed, so think of one that is secure and easy to remember. 6. Create a [employer] password. You can change your password at any time. 7. Enter your Password Reset Question and Answer. This can be used at a later time if you forget your password. 8. Enter your e-mail address. You will receive e-mail notification when new information is available in [address] 9. Click Sign Up. You can now view and download portions of your medical record. 10. Click the Download Summary menu link to download a portable copy of your medical information. If you have questions, please visit the Frequently Asked Questions section of the [employer] website. Remember, [employer] is NOT to be used for urgent needs. For medical emergencies, dial 911. Now available from your iPhone and Android! Please provide this summary of care documentation to your next provider. Your primary care clinician is listed as Jaclyn Hamm. If you have any questions after today's visit, please call [phone].

## 2017-07-12 NOTE — PROGRESS NOTES
Chief Complaint   Patient presents with    Anticoagulation     PT/INR     Medication Evaluation     wants to discuss switching to Xarelto. HPI:      Aleja Valerio is a 80 y.o. male. Debby Hickman is retired from the [employer] at [employer]. He is  and has 2 adult children. 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 He also has chronic atrial fibrillation and is rate controlled with Verapamil and anticoagulated with Warfarin. Recently has taken probiotics. No new meds, antibiotics, ETOH, or med deletions. No bleeding.     New Issues:  Interested in switching to Xarelto    No Known Allergies    Current Outpatient Prescriptions   Medication Sig    ipratropium (ATROVENT) 0.06 % nasal spray 2 Sprays by Both Nostrils route four (4) times daily.  cyanocobalamin (VITAMIN B-12) 1,000 mcg tablet Take 1,000 mcg by mouth daily.  B.infantis-B.ani-B.long-B.bifi (PROBIOTIC 4X) 10-15 mg TbEC Take  by mouth.  BETA-CAROTENE,A,-VITS C,E/MINS (VISION PO) Take  by mouth.  furosemide (LASIX) 40 mg tablet Take 1 Tab by mouth every Monday, Wednesday, Friday.  verapamil ER (CALAN-SR) 180 mg CR tablet Take 180 mg by mouth daily.  warfarin (COUMADIN) 2.5 mg tablet Take 2.5 mg by mouth five (5) days a week. Indications: Non on Wed and Sunday     No current facility-administered medications for this visit. Past Medical History:   Diagnosis Date    Atrial fibrillation (HCC)     CO2 narcosis     uses CPAP    Hypercapnia     Hypertension     LBBB (left bundle branch block)     Macular degeneration     Muscular dystrophy (HCC)     limb-girdle    Pernicious anemia     B-12 def         ROS:  Denies fever, chills, cough, chest pain, SOB,  nausea, vomiting, or diarrhea. Denies wt loss, wt gain, hemoptysis, hematochezia or melena.     Physical Examination:    /86 (BP 1 Location: Left arm, BP Patient Position: Sitting)  Pulse 86 Resp 19  SpO2 98%    General: Alert and Ox3, Fluent speech  HEENT:  NC/AT, EOMI, OP: clear  Neck:  Supple, no adenopathy, JVD, mass or bruit  Chest:  Clear to Ausculation, without wheezes, rales, rubs or ronchi  Cardiac: IRRR  Abdomen:  +BS, soft, nontender without palpable HSM  Extremities:  No cyanosis, clubbing or edema  Neurologic:  Ambulatory without assist, CN 2-12 grossly intact. Moves all extremities. Skin: no rash  Lymphadenopathy: no cervical or supraclavicular nodes    INR 2.8    ASSESSMENT AND PLAN:     1. Transitioning to Xarelto due to instability of INR. Will start with 20 mg daily and reassess in October. In the event that he experiences minor bleeding, we would decrease the dose to 15 mg daily. He will stop warfarin now and start the Xarelto tomorrow night.     Orders Placed This Encounter    COLLECTION CAPILLARY BLOOD SPECIMEN    AMB POC PT/INR       David Lynne MD, [address]

## 2017-07-18 NOTE — TELEPHONE ENCOUNTER
Emaline Scripture,    Could you please call Laurie Rooney back at [phone] in regards to an appointment in October for Ashely Abarca. Thank you.

## 2017-11-10 PROBLEM — L57.0 ACTINIC KERATOSIS: Status: ACTIVE | Noted: 2017-01-01

## 2017-11-10 NOTE — PATIENT INSTRUCTIONS
Vaccine Information Statement    Influenza (Flu) Vaccine (Inactivated or Recombinant): What you need to know    Many Vaccine Information Statements are available in [place] and other languages. See www.immunize.org/vis  Hojas de Información Sobre Vacunas están disponibles en Español y en muchos otros idiomas. Visite www.immunize.org/vis    1. Why get vaccinated? Influenza (flu) is a contagious disease that spreads around the [place] every year, usually between October and May. Flu is caused by influenza viruses, and is spread mainly by coughing, sneezing, and close contact. Anyone can get flu. Flu strikes suddenly and can last several days. Symptoms vary by age, but can include:   fever/chills   sore throat   muscle aches   fatigue   cough   headache    runny or stuffy nose    Flu can also lead to pneumonia and blood infections, and cause diarrhea and seizures in children. If you have a medical condition, such as heart or lung disease, flu can make it worse. Flu is more dangerous for some people. Infants and young children, people 72years of age and older, pregnant women, and people with certain health conditions or a weakened immune system are at greatest risk. Each year thousands of people in the [facility] die from flu, and many more are hospitalized. Flu vaccine can:   keep you from getting flu,   make flu less severe if you do get it, and   keep you from spreading flu to your family and other people. 2. Inactivated and recombinant flu vaccines    A dose of flu vaccine is recommended every flu season. Children 6 months through 6years of age may need two doses during the same flu season. Everyone else needs only one dose each flu season.        Some inactivated flu vaccines contain a very small amount of a mercury-based preservative called thimerosal. Studies have not shown thimerosal in vaccines to be harmful, but flu vaccines that do not contain thimerosal are available. There is no live flu virus in flu shots. They cannot cause the flu. There are many flu viruses, and they are always changing. Each year a new flu vaccine is made to protect against three or four viruses that are likely to cause disease in the upcoming flu season. But even when the vaccine doesnt exactly match these viruses, it may still provide some protection    Flu vaccine cannot prevent:   flu that is caused by a virus not covered by the vaccine, or   illnesses that look like flu but are not. It takes about 2 weeks for protection to develop after vaccination, and protection lasts through the flu season. 3. Some people should not get this vaccine    Tell the person who is giving you the vaccine:     If you have any severe, life-threatening allergies. If you ever had a life-threatening allergic reaction after a dose of flu vaccine, or have a severe allergy to any part of this vaccine, you may be advised not to get vaccinated. Most, but not all, types of flu vaccine contain a small amount of egg protein.  If you ever had Guillain-Barré Syndrome (also called GBS). Some people with a history of GBS should not get this vaccine. This should be discussed with your doctor.  If you are not feeling well. It is usually okay to get flu vaccine when you have a mild illness, but you might be asked to come back when you feel better. 4. Risks of a vaccine reaction    With any medicine, including vaccines, there is a chance of reactions. These are usually mild and go away on their own, but serious reactions are also possible. Most people who get a flu shot do not have any problems with it.      Minor problems following a flu shot include:    soreness, redness, or swelling where the shot was given     hoarseness   sore, red or itchy eyes   cough   fever   aches   headache   itching   fatigue  If these problems occur, they usually begin soon after the shot and last 1 or 2 days. More serious problems following a flu shot can include the following:     There may be a small increased risk of Guillain-Barré Syndrome (GBS) after inactivated flu vaccine. This risk has been estimated at 1 or 2 additional cases per million people vaccinated. This is much lower than the risk of severe complications from flu, which can be prevented by flu vaccine.  Young children who get the flu shot along with pneumococcal vaccine (PCV13) and/or DTaP vaccine at the same time might be slightly more likely to have a seizure caused by fever. Ask your doctor for more information. Tell your doctor if a child who is getting flu vaccine has ever had a seizure. Problems that could happen after any injected vaccine:      People sometimes faint after a medical procedure, including vaccination. Sitting or lying down for about 15 minutes can help prevent fainting, and injuries caused by a fall. Tell your doctor if you feel dizzy, or have vision changes or ringing in the ears.  Some people get severe pain in the shoulder and have difficulty moving the arm where a shot was given. This happens very rarely.  Any medication can cause a severe allergic reaction. Such reactions from a vaccine are very rare, estimated at about 1 in a million doses, and would happen within a few minutes to a few hours after the vaccination. As with any medicine, there is a very remote chance of a vaccine causing a serious injury or death. The safety of vaccines is always being monitored. For more information, visit: www.cdc.gov/vaccinesafety/    5. What if there is a serious reaction? What should I look for?  Look for anything that concerns you, such as signs of a severe allergic reaction, very high fever, or unusual behavior.     Signs of a severe allergic reaction can include hives, swelling of the face and throat, difficulty breathing, a fast heartbeat, dizziness, and weakness - usually within a few minutes to a few hours after the vaccination. What should I do?  If you think it is a severe allergic reaction or other emergency that cant wait, call 9-1-1 and get the person to the nearest hospital. Otherwise, call your doctor.  Reactions should be reported to the Vaccine Adverse Event Reporting System (VAERS). Your doctor should file this report, or you can do it yourself through  the VAERS web site at www.vaers. [facility].gov, or by calling 2-776.148.1377. VAERS does not give medical advice. 6. The National Vaccine Injury Compensation Program    The [facility] Vaccine Injury Compensation Program (VICP) is a federal program that was created to compensate people who may have been injured by certain vaccines. Persons who believe they may have been injured by a vaccine can learn about the program and about filing a claim by calling 6-665.125.6101 or visiting the [employer] website at www.[facility].gov/vaccinecompensation. There is a time limit to file a claim for compensation. 7. How can I learn more?  Ask your healthcare provider. He or she can give you the vaccine package insert or suggest other sources of information.  Call your local or state health department.  Contact the Centers for Disease Control and Prevention (CDC):  - Call 8-892.112.4573 (1-800-CDC-INFO) or  - Visit CDCs website at www.cdc.gov/flu    Vaccine Information Statement   Inactivated Influenza Vaccine   8/7/2015  42 MALAIKA [address] [place] [id number]    Department of Health and Human Services  Centers for Disease Control and Prevention    Office Use Only

## 2017-11-10 NOTE — PROGRESS NOTES
Vero Fisher is a 80 y.o. male who presents for routine immunizations. He denies any symptoms , reactions or allergies that would exclude them from being immunized today. Risks and adverse reactions were discussed and the VIS was given to them. All questions were addressed.     Briana Reyes RN

## 2017-11-10 NOTE — PROGRESS NOTES
Chief Complaint   Patient presents with    Anticoagulation         HPI:       is a 80 y.o. male. Karin Hernandez is retired from the [employer] at [employer]. He is  and has 2 adult children. 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 He also has chronic atrial fibrillation and is rate controlled with Verapamil and anticoagulated with Xarelto. Would like scaly areas on scalp treated. New Issues:  Flu vaccine today    No Known Allergies    Current Outpatient Prescriptions   Medication Sig    ipratropium (ATROVENT) 0.06 % nasal spray 2 Sprays by Both Nostrils route four (4) times daily.  rivaroxaban (XARELTO) 20 mg tab tablet Take 1 Tab by mouth daily.  verapamil ER (CALAN-SR) 180 mg CR tablet Take 1 Tab by mouth daily.  BETA-CAROTENE,A,-VITS C,E/MINS (VISION PO) Take  by mouth.  cyanocobalamin (VITAMIN B-12) 1,000 mcg tablet Take 1,000 mcg by mouth daily.  B.infantis-B.ani-B.long-B.bifi (PROBIOTIC 4X) 10-15 mg TbEC Take  by mouth.  furosemide (LASIX) 40 mg tablet Take 1 Tab by mouth every Monday, Wednesday, Friday. No current facility-administered medications for this visit. Past Medical History:   Diagnosis Date    Atrial fibrillation (HCC)     CO2 narcosis     uses CPAP    Hypercapnia     Hypertension     LBBB (left bundle branch block)     Macular degeneration     Muscular dystrophy (HCC)     limb-girdle    Pernicious anemia     B-12 def         ROS:  Denies fever, chills, cough, chest pain, SOB,  nausea, vomiting, or diarrhea. Denies wt loss, wt gain, hemoptysis, hematochezia or melena.     Physical Examination:    /70 (BP 1 Location: Left arm, BP Patient Position: Sitting)  Pulse 73  Resp 20  Wt 167 lb (75.8 kg)  SpO2 95%  BMI 23.96 kg/m2    General: Alert and Ox3, Speech: hoarse whisper  HEENT:  NC/AT, EOMI, OP: clear  Neck:  Supple, no adenopathy, JVD, mass or bruit  Chest: Clear to Ausculation, without wheezes, rales, rubs or ronchi  Cardiac: IRRR  Abdomen:  +BS, soft, nontender without palpable HSM  Extremities:  No cyanosis, clubbing or edema  Neurologic:  Ambulatory with walker, CN 2-12 grossly intact. Moves all extremities. Skin: no rash  Lymphadenopathy: no cervical or supraclavicular nodes    Procedure Note:  Cryotherapy for the treatment of a single Actinic Keratosis    The risks, benefits and alternatives to treatment were carefully explained to the patient who voiced understanding and  desires to proceed. With the patient's verbal permission, a single actinic keratosis was treated with 2 applications of Liquid Nitrogen. The patient tolerated the procedure well and there were no complications. The patient was instructed to RTC for follow up if redness, swelling or new lesions emerge. ASSESSMENT AND PLAN:     1.  AF:  Doing well with Xarelto  2. Flu vaccine  3. AK:  Treated.   4.  RTC in May    Orders Placed This Encounter    Influenza virus vaccine ([address]) 72 years and older (08320)   [facility] 68 fee () for Medicare insured patients       Amauri Tabor MD, [address]

## 2017-11-10 NOTE — MR AVS SNAPSHOT
Visit Information Date & Time Provider Department Dept. Phone Encounter #  
 11/10/2017 10:30 AM David Ramon MD [address] [place] [address] [id number] Follow-up Instructions Return in about 6 months (around 5/10/2018). Follow-up and Disposition History Upcoming Health Maintenance Date Due Influenza Age 5 to Adult 8/1/2017 MEDICARE YEARLY EXAM 3/11/2018 GLAUCOMA SCREENING Q2Y 4/17/2019 DTaP/Tdap/Td series (2 - Td) 6/2/2027 Allergies as of 11/10/2017  Review Complete On: 11/10/2017 By: David Ramon MD  
 No Known Allergies Current Immunizations  Reviewed on 7/12/2017 Name Date Influenza High Dose Vaccine PF 11/10/2017 10:55 AM, 10/21/2016 11:39 AM  
 Influenza Vaccine 11/20/2015 10:33 AM, 10/22/2014 Pneumococcal Conjugate (PCV-13) 1/26/2016 10:37 AM  
 Pneumococcal Vaccine (Unspecified Type) 5/5/2010 Varicella Virus Vaccine 4/25/2012 Not reviewed this visit You Were Diagnosed With   
  
 Codes Comments Chronic atrial fibrillation (HCC)    -  Primary ICD-10-CM: M18.7 ICD-9-CM: 427.31 Encounter for immunization     ICD-10-CM: X23 ICD-9-CM: V03.89 Muscular dystrophy ([facility]     ICD-10-CM: G71.0 ICD-9-CM: 359.1 Actinic keratosis     ICD-10-CM: L57.0 ICD-9-CM: 702.0 Vitals BP Pulse Resp Weight(growth percentile) SpO2 BMI  
 146/70 (BP 1 Location: Left arm, BP Patient Position: Sitting) 73 20 167 lb (75.8 kg) 95% 23.96 kg/m2 Smoking Status Former Smoker BMI and BSA Data Body Mass Index Body Surface Area  
 23.96 kg/m 2 1.93 m 2 Preferred Pharmacy Pharmacy Name Phone [facility] [place] [id number] [place] [id number] Chad Yuki [phone] Your Updated Medication List  
  
   
This list is accurate as of: 11/10/17 11:17 AM.  Always use your most recent med list.  
  
  
  
  
 furosemide 40 mg tablet Commonly known as:  LASIX Take 1 Tab by mouth every Monday, Wednesday, Friday. ipratropium 0.06 % nasal spray Commonly known as:  ATROVENT  
2 Sprays by Both Nostrils route four (4) times daily. PROBIOTIC 4X 10-15 mg Tbec Generic drug:  B.infantis-B.ani-B.long-B.bifi Take  by mouth.  
  
 rivaroxaban 20 mg Tab tablet Commonly known as:  Terri Fellers Take 1 Tab by mouth daily. verapamil  mg CR tablet Commonly known as:  CALAN-SR Take 1 Tab by mouth daily. VISION PO Take  by mouth. VITAMIN B-12 1,000 mcg tablet Generic drug:  cyanocobalamin Take 1,000 mcg by mouth daily. We Performed the Following ADMIN INFLUENZA VIRUS VAC [ HCPCS] DESTRUC PREMALIGNANT, FIRST LESION [09881 CPT(R)] INFLUENZA VIRUS VACCINE, HIGH DOSE SEASONAL, PRESERVATIVE FREE [19954 CPT(R)] Follow-up Instructions Return in about 6 months (around 5/10/2018). Patient Instructions Vaccine Information Statement Influenza (Flu) Vaccine (Inactivated or Recombinant): What you need to know Many Vaccine Information Statements are available in Indian and other languages. See www.immunize.org/vis Hojas de Información Sobre Vacunas están disponibles en Español y en muchos otros idiomas. Visite www.immunize.org/vis 1. Why get vaccinated? Influenza (flu) is a contagious disease that spreads around the [place] every year, usually between October and May. Flu is caused by influenza viruses, and is spread mainly by coughing, sneezing, and close contact. Anyone can get flu. Flu strikes suddenly and can last several days. Symptoms vary by age, but can include: 
 fever/chills  sore throat  muscle aches  fatigue  cough  headache  runny or stuffy nose Flu can also lead to pneumonia and blood infections, and cause diarrhea and seizures in children. If you have a medical condition, such as heart or lung disease, flu can make it worse. Flu is more dangerous for some people. Infants and young children, people 72years of age and older, pregnant women, and people with certain health conditions or a weakened immune system are at greatest risk. Each year thousands of people in the [facility] die from flu, and many more are hospitalized. Flu vaccine can: 
 keep you from getting flu, 
 make flu less severe if you do get it, and 
 keep you from spreading flu to your family and other people. 2. Inactivated and recombinant flu vaccines A dose of flu vaccine is recommended every flu season. Children 6 months through 6years of age may need two doses during the same flu season. Everyone else needs only one dose each flu season. Some inactivated flu vaccines contain a very small amount of a mercury-based preservative called thimerosal. Studies have not shown thimerosal in vaccines to be harmful, but flu vaccines that do not contain thimerosal are available. There is no live flu virus in flu shots. They cannot cause the flu. There are many flu viruses, and they are always changing. Each year a new flu vaccine is made to protect against three or four viruses that are likely to cause disease in the upcoming flu season. But even when the vaccine doesnt exactly match these viruses, it may still provide some protection Flu vaccine cannot prevent: 
 flu that is caused by a virus not covered by the vaccine, or 
 illnesses that look like flu but are not. It takes about 2 weeks for protection to develop after vaccination, and protection lasts through the flu season. 3. Some people should not get this vaccine Tell the person who is giving you the vaccine:  If you have any severe, life-threatening allergies.    
If you ever had a life-threatening allergic reaction after a dose of flu vaccine, or have a severe allergy to any part of this vaccine, you may be advised not to get vaccinated. Most, but not all, types of flu vaccine contain a small amount of egg protein.  If you ever had Guillain-Barré Syndrome (also called GBS). Some people with a history of GBS should not get this vaccine. This should be discussed with your doctor.  If you are not feeling well. It is usually okay to get flu vaccine when you have a mild illness, but you might be asked to come back when you feel better. 4. Risks of a vaccine reaction With any medicine, including vaccines, there is a chance of reactions. These are usually mild and go away on their own, but serious reactions are also possible. Most people who get a flu shot do not have any problems with it. Minor problems following a flu shot include:  
 soreness, redness, or swelling where the shot was given  hoarseness  sore, red or itchy eyes  cough  fever  aches  headache  itching  fatigue If these problems occur, they usually begin soon after the shot and last 1 or 2 days. More serious problems following a flu shot can include the following:  There may be a small increased risk of Guillain-Barré Syndrome (GBS) after inactivated flu vaccine. This risk has been estimated at 1 or 2 additional cases per million people vaccinated. This is much lower than the risk of severe complications from flu, which can be prevented by flu vaccine.  Young children who get the flu shot along with pneumococcal vaccine (PCV13) and/or DTaP vaccine at the same time might be slightly more likely to have a seizure caused by fever. Ask your doctor for more information. Tell your doctor if a child who is getting flu vaccine has ever had a seizure. Problems that could happen after any injected vaccine:  People sometimes faint after a medical procedure, including vaccination.  Sitting or lying down for about 15 minutes can help prevent fainting, and injuries caused by a fall. Tell your doctor if you feel dizzy, or have vision changes or ringing in the ears.  Some people get severe pain in the shoulder and have difficulty moving the arm where a shot was given. This happens very rarely.  Any medication can cause a severe allergic reaction. Such reactions from a vaccine are very rare, estimated at about 1 in a million doses, and would happen within a few minutes to a few hours after the vaccination. As with any medicine, there is a very remote chance of a vaccine causing a serious injury or death. The safety of vaccines is always being monitored. For more information, visit: www.cdc.gov/vaccinesafety/ 
 
 
The [facility] Vaccine Injury Compensation Program (VICP) is a federal program that was created to compensate people who may have been injured by certain vaccines.  
 
Persons who believe they may have been injured by a vaccine can learn about the program and about filing a claim by calling 4-921.193.3341 or visiting the [address] [employer] website at www.[facility].gov/vaccinecompensation. There is a time limit to file a claim for compensation. 7. How can I learn more?  Ask your healthcare provider. He or she can give you the vaccine package insert or suggest other sources of information.  Call your local or state health department.  Contact the Centers for Disease Control and Prevention (CDC): 
- Call 3-535.289.8570 (1-800-CDC-INFO) or 
- Visit CDCs website at www.cdc.gov/flu Vaccine Information Statement Inactivated Influenza Vaccine 8/7/2015 
42 MALAIKA Mike [id number]-[id number] Department [facility] and [employer] Centers for Disease Control and Prevention Office Use Only Introducing [facility]! Renee Nichols introduces [employer] patient portal. Now you can access parts of your medical record, email your doctor's office, and request medication refills online. 1. In your internet browser, go to [email or web address]. [email or web address]/[place] 2. Click on the First Time User? Click Here link in the Sign In box. You will see the New Member Sign Up page. 3. Enter your [employer] Access Code exactly as it appears below. You will not need to use this code after youve completed the sign-up process. If you do not sign up before the expiration date, you must request a new code. · [employer] Access Code: [email or web address] Expires: 2/8/2018 10:54 AM 
 
4. Enter the last four digits of your Social Security Number (xxxx) and Date of Birth (mm/dd/yyyy) as indicated and click Submit. You will be taken to the next sign-up page. 5. Create a Scheduling Employee Scheduling Softwaret ID. This will be your [employer] login ID and cannot be changed, so think of one that is secure and easy to remember. 6. Create a Scheduling Employee Scheduling Softwaret password. You can change your password at any time. 7. Enter your Password Reset Question and Answer. This can be used at a later time if you forget your password. 8. Enter your e-mail address.  You will receive e-mail notification when new information is available in [employer]. 9. Click Sign Up. You can now view and download portions of your medical record. 10. Click the Download Summary menu link to download a portable copy of your medical information. If you have questions, please visit the Frequently Asked Questions section of the [employer] website. Remember, [employer] is NOT to be used for urgent needs. For medical emergencies, dial 911. Now available from your iPhone and Android! Please provide this summary of care documentation to your next provider. Your primary care clinician is listed as Coleman Vang. If you have any questions after today's visit, please call [phone].

## 2018-01-01 ENCOUNTER — TELEPHONE (OUTPATIENT)
Dept: FAMILY MEDICINE CLINIC | Age: 83
End: 2018-01-01

## 2018-01-01 RX ORDER — AZITHROMYCIN 250 MG/1
TABLET, FILM COATED ORAL
Qty: 6 TAB | Refills: 4 | Status: SHIPPED | OUTPATIENT
Start: 2018-01-01

## 2018-01-18 NOTE — TELEPHONE ENCOUNTER
----- Message from Anoop Buffy sent at 1/18/2018  2:33 PM EST -----  Regarding: /Telephone  Pt wants to come in on May 8,9 or 10 at around 10 or 10:30 but no appt was avail. Pt only wants to see . Best contact number is [phone].

## 2018-01-24 NOTE — TELEPHONE ENCOUNTER
Spoke with wife, states  Melo Ramirez has been on the couch for 2 days, O2sat in the 70's, 66 at this call, asked if she should call  Medic as she can't drive him to the hospital.Reviewed that he is conscious, she is afraid he will fall when he gets up, deep cough. She will call EMS.

## 2021-09-17 NOTE — MR AVS SNAPSHOT
The documentation recorded by the scribe accurately and completely reflects the service(s) I personally performed and the decisions made by me.     TIME SPENT IN REVIEWING RECORDS, LABS, IMAGING, CONFERRING WITH TEAM MEMBERS,  AND DISCUSSION WITH PATIENT APPROX 30 MIN  Chief complaint:   Chief Complaint   Patient presents with   • Follow-up   • Atrial Fibrillation       Vitals:  Visit Vitals  /61 (BP Location: RUE - Right upper extremity, Patient Position: Sitting, Cuff Size: Large Adult)   Pulse 84   Resp 18   Ht 6' (1.829 m)   Wt 103 kg (227 lb)   SpO2 92% Comment: 3 liters   BMI 30.79 kg/m²       HISTORY OF PRESENT ILLNESS     Established patient. Last visit 3/19/2021    Follow up for PAF to which he underwent AF ablation on 6/18/18. He continues on with Sotalol 80 mg BID, reduced to 40 mg BID secondary to elevated creatine (June 2021). His CHADSVASC Stroke Risk Score is 4 (CHF, HTN, DM, Age >65 yrs); he takes Warfarin as an AC. He denies concerns of bleeding.   Wears supplemental O2 due to longstanding COPD.     Last visit we made no changes    Patient states he overall feels well. He denies SOB, denies lightheadedness, dizziness, denies palpitations. He denies new or different cough, denies edema, denies syncope. He reports he is \"cold\" all the time.      Other significant problems:  Patient Active Problem List    Diagnosis Date Noted   • Acute on chronic diastolic (congestive) heart failure (CMS/[facility]) 02/01/2021     Priority: Medium   • Myofascial pain 06/12/2019     Priority: Medium   • Therapeutic drug monitoring 08/20/2021     Priority: Low   • Long term current use of anticoagulant therapy 08/20/2021     Priority: Low   • Shortness of breath 02/01/2021     Priority: Low   • Abnormal CXR (chest x-ray) 02/01/2021     Priority: Low   • Chronic disease anemia 02/01/2021     Priority: Low     12/04/20H/H: 10.1/31.8     • Generalized weakness 02/01/2021     Priority: Low   • Moderate pulmonary hypertension  Visit Information Date & Time Provider Department Dept. Phone Encounter #  
 6/2/2017  9:30 [place] [address] [address] [id number] Upcoming Health Maintenance Date Due INFLUENZA AGE 9 TO ADULT 8/1/2017 MEDICARE YEARLY EXAM 3/11/2018 GLAUCOMA SCREENING Q2Y 4/17/2019 DTaP/Tdap/Td series (2 - Td) 6/2/2027 Allergies as of 6/2/2017  Review Complete On: 6/2/2017 By: Rebecca Caicedo RN No Known Allergies Current Immunizations  Never Reviewed Name Date Influenza High Dose Vaccine PF 10/21/2016 11:39 AM  
 Influenza Vaccine 11/20/2015 10:33 AM, 10/22/2014 Pneumococcal Conjugate (PCV-13) 1/26/2016 10:37 AM  
 Pneumococcal Vaccine (Unspecified Type) 5/5/2010 Varicella Virus Vaccine 4/25/2012 Not reviewed this visit You Were Diagnosed With   
  
 Codes Comments Chronic atrial fibrillation (HCC)    -  Primary ICD-10-CM: L92.1 ICD-9-CM: 427.31 Vitals BP Pulse Resp Weight(growth percentile) SpO2 BMI  
 138/78 (BP 1 Location: Left arm, BP Patient Position: Sitting) 60 16 170 lb (77.1 kg) 96% 24.39 kg/m2 Smoking Status Former Smoker BMI and BSA Data Body Mass Index Body Surface Area  
 24.39 kg/m 2 1.95 m 2 Preferred Pharmacy Pharmacy Name Phone [facility] [place] [address] [address] Mirza [phone] Your Updated Medication List  
  
   
This list is accurate as of: 6/2/17 10:42 AM.  Always use your most recent med list.  
  
  
  
  
 furosemide 40 mg tablet Commonly known as:  LASIX Take 1 Tab by mouth every Monday, Wednesday, Friday. ipratropium 0.06 % nasal spray Commonly known as:  ATROVENT  
USE TWO SPRAY(S) IN EACH NOSTRIL 4 TIMES DAILY PROBIOTIC 4X 10-15 mg Tbec Generic drug:  B.infantis-B.ani-B.long-B.bifi Take  by mouth.  
  
 verapamil  mg CR tablet Commonly known as:  CALAN-SR  
 (CMS/HCC) 02/01/2021     Priority: Low     Chronic Hypoxic respiratory failure,IGLESIAS 2/2 Sev. COPD On 2-3 L NC rest,5 L NC w/activity  Mod. P. HTN,Chronic Cor Pulmonale:On 2-3 L NC rest,5 L NC w/activity1/29/21ECHO:Hyperdynamic LVSF.LVEF, 70 %.Mod. P. HTN, RVSP 44.8 mmHg.Trace-mild MV regurgitation.         • Pulmonary hypertension with chronic cor pulmonale (CMS/HCC) 02/01/2021     Priority: Low     Chronic Hypoxic respiratory failure,IGLESIAS 2/2 Sev. COPD On 2-3 L NC rest,5 L NC w/activity  Mod. P. HTN,Chronic Cor Pulmonale:On 2-3 L NC rest,5 L NC w/activity1/29/21ECHO:Hyperdynamic LVSF.LVEF, 70 %.Mod. P. HTN, RVSP 44.8 mmHg.Trace-mild MV regurgitation.  Chronic diastolic CHF1/29/21ECHO:Hyperdynamic LVSF.LVEF, 70 %.Mod.P. HTN, RVS 44.8 mmHg.Trace-mild MV regurgitation.       • History of 2019 novel coronavirus disease (COVID-19) 12/02/2020     Priority: Low   • Left renal artery stenosis (CMS/HCC) 01/14/2020     Priority: Low   • Solitary left kidney 01/14/2020     Priority: Low     Non functioning right kidney due to multiple cysts and borderline elevated velocities within the left renal artery per imaging in 2015.      • Chronic diastolic heart failure (CMS/HCC) 01/05/2020     Priority: Low     Mod. P. HTN,Chronic Cor Pulmonale:On 2-3 L NC rest,5 L NC w/activity1/29/21ECHO:Hyperdynamic LVSF.LVEF, 70 %.Mod. P. HTN, RVSP 44.8 mmHg.Trace-mild MV regurgitation.  Chronic diastolic CHF1/29/21ECHO:Hyperdynamic LVSF.LVEF, 70 %.Mod.P. HTN, RVS 44.8 mmHg.Trace-mild  MV regurgitation.  Parox. A. fib. ablation with DCCV 6/18. On Sotalol, AC with warfarin,f/u w/ Dr. Mayo .1/29/21ECHO:Hyperdynamic LVSF.LVEF, 70 %.Mod. P. HTN, RVSP 44.8 mmHg.Trace-mild MV regurgitation.     • Chronic respiratory failure with hypoxia (CMS/[facility]) 09/17/2019     Priority: Low     Chronic Hypoxic respiratory failure,IGLESIAS 2/2 Sev. COPD On 2-3 L NC rest,5 L NC w/activity  Mod. P. HTN,Chronic Cor Pulmonale:On 2-3 L NC rest,5 L NC  Take 180 mg by mouth daily. VISION PO Take  by mouth. VITAMIN B-12 1,000 mcg tablet Generic drug:  cyanocobalamin Take 1,000 mcg by mouth daily. warfarin 2.5 mg tablet Commonly known as:  COUMADIN Take 2.5 mg by mouth six (6) days a week. Indications: 1/2 tablet on Sunday We Performed the Following AMB POC PT/INR [36393 CPT(R)] COLLECTION CAPILLARY BLOOD SPECIMEN [86056 CPT(R)] Introducing [facility] SERVICES! Alayna Carnes introduces [employer] patient portal. Now you can access parts of your medical record, email your doctor's office, and request medication refills online. 1. In your internet browser, go to [email or web address] 2. Click on the First Time User? Click Here link in the Sign In box. You will see the New Member Sign Up page. 3. Enter your [employer] Access Code exactly as it appears below. You will not need to use this code after youve completed the sign-up process. If you do not sign up before the expiration date, you must request a new code. · [employer] Access Code: [email or web address] Expires: 6/8/2017 11:57 AM 
 
4. Enter the last four digits of your Social Security Number (xxxx) and Date of Birth (mm/dd/yyyy) as indicated and click Submit. You will be taken to the next sign-up page. 5. Create a [employer] ID. This will be your [employer] login ID and cannot be changed, so think of one that is secure and easy to remember. 6. Create a [employer] password. You can change your password at any time. 7. Enter your Password Reset Question and Answer. This can be used at a later time if you forget your password. 8. Enter your e-mail address. You will receive e-mail notification when new information is available in [address] 9. Click Sign Up. You can now view and download portions of your medical record. 10. Click the Download Summary menu link to download a portable copy of your medical information. If you have questions, please visit the Frequently Asked Questions section of the [employer] website. Remember, [employer] is NOT to be used for urgent needs. For medical emergencies, dial 911. Now available from your iPhone and Android! Please provide this summary of care documentation to your next provider. Your primary care clinician is listed as Ole Lesser. If you have any questions after today's visit, please call [phone]. w/activity1/29/21ECHO:Hyperdynamic LVSF.LVEF, 70 %.Mod. P. HTN, RVSP 44.8 mmHg.Trace-mild MV regurgitation.       • Long term (current) use of anticoagulants- on Coumadin 08/27/2019     Priority: Low       Parox. A. fib. ablation with DCCV 6/18. On Sotalol, AC with warfarin,f/u w/ Dr. Mayo .1/29/21ECHO:Hyperdynamic LVSF.LVEF, 70 %.Mod. P. HTN, RVSP 44.8 mmHg.Trace-mild MV regurgitation.     • High risk medication use 08/27/2019     Priority: Low   • Chronic bilateral low back pain with bilateral sciatica 06/12/2019     Priority: Low   • Lumbar facet arthropathy 06/12/2019     Priority: Low   • Lumbar radiculopathy 06/12/2019     Priority: Low   • CKD (chronic kidney disease) stage 3, GFR 30-59 ml/min (CMS/[facility]) 03/15/2019     Priority: Low   • Anemia due to stage 3 chronic kidney disease (CMS/[facility])      Priority: Low     12/04/2020 H/H 10.1/31.8     • Iron deficiency      Priority: Low   • Proteinuria, unspecified      Priority: Low   • History of colonic polyps 01/10/2019     Priority: Low     8 polyps     • ASIYA (iron deficiency anemia) 01/10/2019     Priority: Low   • Lung nodules 06/13/2018     Priority: Low     IMPRESSION:  This is an interpretation of the non-cardiovascular findings. Please see  separate primary report for cardiovascular findings.     1. The lungs are only partially imaged. Innumerable pulmonary nodules  bilaterally. Some nodules, measuring up to 5 mm, are new, primarily within  the right lower lobe. Nodules measuring 7 to 8 mm in the vicinity of the  right middle lobe have increased in size since the prior examination.  Multiple additional nodules are resolved/improved or stable. Findings are  most compatible with an infectious/inflammatory process. However, continued  follow-up is suggested.  2. Interval resolution of bilateral small pleural effusions.  3. Previously noted mediastinal/hilar lymphadenopathy appears improved  since the prior examination. However, this is only partially  imaged.       • Paroxysmal atrial fibrillation (CMS/HCC)S/P Ablation 06/01/2018     Priority: Low     Parox. A. fib. ablation with DCCV 6/18. On Sotalol, AC with warfarin,f/u w/ Dr. Mayo .1/29/21ECHO:Hyperdynamic LVSF.LVEF, 70 %.Mod. P. HTN, RVSP 44.8 mmHg.Trace-mild MV regurgitation.     • Combined forms of age-related cataract, bilateral 09/15/2017     Priority: Low   • Incomplete tear of left rotator cuff 06/12/2017     Priority: Low   • Osteoarthritis of left glenohumeral joint 06/12/2017     Priority: Low   • Tendinopathy of left shoulder 06/12/2017     Priority: Low   • Type 2 diabetes mellitus with stage 3b chronic kidney disease, without long-term current use of insulin (CMS/[facility]) 10/20/2016     Priority: Low     11/09/20 A1c 7.2     • Protein in urine 10/10/2016     Priority: Low   • Hx Atrial flutter with rapid ventricular response/A.Fib. S/P ablation 05/27/2016     Priority: Low     Parox. A. Fib./Flutter  S/P ablation with DCCV 6/18. On Sotalol, AC with warfarin,f/u w/ Dr. Mayo .1/29/21ECHO:Hyperdynamic LVSF.LVEF, 70 %.Mod. P. HTN, RVSP 44.8 mmHg.Trace-mild MV regurgitation.     • Dyslipidemia 05/25/2016     Priority: Low     35.4% 10 year ASCVD risk decreases to 8.8%      • Hypertensive renal disease      Priority: Low   • Hx of colonic polyps 01/26/2016     Priority: Low     Tubular/tubovillous adenoma ascending colon. Hyperplastic polyp rectum.     • Diverticulosis of large intestine without hemorrhage 01/25/2016     Priority: Low   • Statin intolerance 07/22/2015     Priority: Low   • Lumbar disc herniation 06/18/2014     Priority: Low     Significant disc disease at L4-L5. This includes a left paracentral disc extrusion which appears to contact the descending left L5 nerve root. There is also a left foraminal disc protrusion which contacts the exiting left L4 nerve root.     • Hx Cellulitis and abscess of unspecified site 12/11/2013     Priority: Low     12/11/13: Right elbow/upper extremity  cellulitis     • Nonalcoholic fatty liver disease      Priority: Low   • Emphysema of lung (CMS/HCC)      Priority: Low     9/14/10.PFT-Sev. COPD/emphysema by Dr. Hallman  Chronic Hypoxic respiratory failure,IGLESIAS 2/2 Sev. COPD On 2-3 L NC rest,5 L NC w/activity  Mod. P. HTN,Chronic Cor Pulmonale:On 2-3 L NC rest,5 L NC w/activity1/29/21ECHO:Hyperdynamic LVSF.LVEF, 70 %.Mod. P. HTN, RVSP 44.8 mmHg.Trace-mild MV regurgitation.       • RAD (reactive airway disease)      Priority: Low     9/14/10.PFT-Sev. COPD/emphysema by Dr. Hallman2/22/12.  Mod-Sev.  Persistent asthma     • Chronic kidney disease, stage III (moderate) (CMS/[facility])      Priority: Low     12/04/2020, CREA-1.59GFR:44     • Gout      Priority: Low     seen by Dr. Tomas 5/12     • COPD, severe (CMS/[facility])      Priority: Low     2008-mod.COPD 20% change w/ bronchodilator9/14/10.PFT-Sev. COPD/emphysema by Dr. Hallman.  Chronic Hypoxic respiratory failure,IGLESIAS 2/2 Sev. COPD On 2-3 L NC rest,5 L NC w/activity  Mod. P. HTN,Chronic Cor Pulmonale:On 2-3 L NC rest,5 L NC w/activity1/29/21ECHO:Hyperdynamic LVSF.LVEF, 70 %.Mod. P. HTN, RVSP 44.8 mmHg.Trace-mild MV regurgitation.       • Chronic shortness of breath      Priority: Low     9/14/10.PFT-Sev. COPD/emphysema by Dr. Hallman.9/4,/09-Cardiolite stress-N EF,,neg. echo: Mild LVH, DD,,N. EF  Chronic Hypoxic respiratory failure,IGLESIAS 2/2 Sev. COPD On 2-3 L NC rest,5 L NC w/activity  Mod. P. HTN,Chronic Cor Pulmonale:On 2-3 L NC rest,5 L NC w/activity1/29/21ECHO:Hyperdynamic LVSF.LVEF, 70 %.Mod. P. HTN, RVSP 44.8 mmHg.Trace-mild MV regurgitation.  Chronic diastolic CHF1/29/21ECHO:Hyperdynamic LVSF.LVEF, 70 %.Mod.P. HTN, RVS 44.8 mmHg.Trace-mild  MV regurgitation.  Parox. A. fib. ablation with DCCV 6/18. On Sotalol, AC with warfarin,f/u w/ Dr. Mayo .1/29/21ECHO:Hyperdynamic LVSF.LVEF, 70 %.Mod. P. HTN, RVSP 44.8 mmHg.Trace-mild MV regurgitation.     • Chronic diarrhea      Priority: Low     due to focal villous atrophy-by   Sherri.TC- o/w NEG.Dr. jacques     • Internal hemorrhoids      Priority: Low     due to focal villous atrophy-by Dr. Polanco.TC-IH o/w NEG.Dr. 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 Tomas 1/9/12-no features of lupus or scleroderma     • Generalized osteoarthrosis, unspecified site 01/09/2012     Priority: Low     shoulders, hands, elbows, feet1/9/12 seen by Dr. Tomas     • H/O multiple pulmonary nodules 09/14/2010     Priority: Low     2/23/13. last CT scan1. Stable nodules bilaterally-right multiple, left single. A single new 6 mm nodule ,lft.12/2,/09Bronh-Dr. Hallman, chronic bronchitic changes.  Path- neg.     • History of duodenal ulcer 10/31/2008     Priority: Low     10/31/08-EGD by Dr. CATHERINE Giron     • Benign hypertension with chronic kidney disease, stage III (CMS/HCC) 01/15/2007     Priority: Low   • Metabolic syndrome 12/25/2006     Priority: Low   • DJD (degenerative joint disease), multiple sites 03/03/2004     Priority: Low     shoulders,hands, knees, feet-seen by Dr. Tomas 1/9//12     • GERD (gastroesophageal reflux disease) 12/12/1995     Priority: Low     12/12/95-EGD reflux esophagitis, erosive duodenitis by Dr. jacques         PAST MEDICAL, FAMILY AND SOCIAL HISTORY     Medications:  Current Outpatient Medications   Medication   • doxazosin (CARDURA) 4 MG tablet   • allopurinol (ZYLOPRIM) 300 MG tablet   • potassium chloride (KLOR-CON M) 10 MEQ jakob ER tablet   • budesonide-formoterol (SYMBICORT) 160-4.5 MCG/ACT inhaler   • atorvastatin (LIPITOR) 80 MG tablet   • tiotropium (Spiriva HandiHaler) 18 MCG  capsule for inhaler   • calcitRIOL (ROCALTROL) 0.25 MCG capsule   • albuterol (VENTOLIN) (2.5 MG/3ML) 0.083% nebulizer solution   • sotalol (BETAPACE) 80 MG tablet   • furosemide (LASIX) 20 MG tablet   • hydrALAZINE (APRESOLINE) 100 MG tablet   • sAXagliptin HCl (Onglyza) 2.5 MG Tab   • glipiZIDE (GLUCOTROL ER) 10 MG 24 hr tablet   • amLODIPine (NORVASC) 10 MG tablet   • warfarin (COUMADIN) 5 MG tablet   • ferrous sulfate 325 (65 FE) MG tablet   • oxygen (O2) gas   • fexofenadine (Allegra Allergy) 180 MG tablet   • pantoprazole (PROTONIX) 40 MG tablet   • allopurinol (ZYLOPRIM) 100 MG tablet   • albuterol (ProAir HFA) 108 (90 Base) MCG/ACT inhaler   • colchicine (COLCRYS) 0.6 MG tablet   • fluticasone (FLONASE) 50 MCG/ACT nasal spray   • blood glucose test strip   • acetaminophen (TYLENOL) 650 MG CR tablet   • Multiple Vitamins-Minerals (MULTIVITAMIN PO)   • aspirin 81 MG EC tablet     No current facility-administered medications for this visit.       Allergies:  ALLERGIES:   Allergen Reactions   • Crestor [Rosuvastatin Calcium] MYALGIA   • Hctz [Hydrochlorothiazide] Other (See Comments)     Leg cramps   • Prilosec [Omeprazole Magnesium] DIARRHEA       Past Medical  History/Surgeries:  Past Medical History:   Diagnosis Date   • Anemia due to stage 3 chronic kidney disease (CMS/HCC)    • Anemia due to stage 3 chronic kidney disease (CMS/HCC)     12/04/2020 H/H 10.1/31.8   • Astigmatism 1/24,/02   • Atrial flutter with rapid ventricular response (CMS/HCC) 5/27/2016    • Atrophic kidney     right   • Benign hypertension with chronic kidney disease, stage III (CMS/HCC) 1/15/2007   • Bronchitis    • Cataract     bilateral   • Chronic diarrhea     due to focal villous atrophy-by Dr. Polanco,.- o/w NEG.Dr. ajcques   • Chronic diastolic heart failure (CMS/[facility]) 1/5/2020    Mod. P. HTN,Chronic Cor Pulmonale:On 2-3 L NC rest,5 L NC w/activity1/29/21ECHO:Hyperdynamic LVSF.LVEF, 70 %.Mod. P. HTN, RVSP 44.8 mmHg.Trace-mild MV  regurgitation. Chronic diastolic CHF1/29/21ECHO:Hyperdynamic LVSF.LVEF, 70 %.Mod.P. HTN, RVS 44.8 mmHg.Trace-mild MV regurgitation. Parox. A. fib. ablation with DCCV 6/18. On Sotalol, AC with warfarin,f/u w/ Dr. Mayo .1/29/21ECHO:Hyperdynamic LVSF.   • Chronic disease anemia 2/1/2021 12/04/20H/H: 10.1/31.8   • Chronic kidney disease, stage III (moderate) (CMS/[facility])     5/29/12, CREA-1.26GFR>60   • Chronic respiratory failure with hypoxia (CMS/[facility]) 9/17/2019    Chronic Hypoxic respiratory failure,IGLESIAS 2/2 Sev. COPD On 2-3 L NC rest,5 L NC w/activity Mod. P. HTN,Chronic Cor Pulmonale:On 2-3 L NC rest,5 L NC w/activity1/29/21ECHO:Hyperdynamic LVSF.LVEF, 70 %.Mod. P. HTN, RVSP 44.8 mmHg.Trace-mild MV regurgitation.    • Chronic shortness of breath     9/14/10.PFT-Sev. COPD/emphysema by Dr. Hallman.9/4,/09-Cardiolite stress-N EF,,neg. echo: Mild LVH, DD,,N. EF Chronic Hypoxic respiratory failure,IGLESIAS 2/2 Sev. COPD On 2-3 L NC rest,5 L NC w/activity Mod. P. HTN,Chronic Cor Pulmonale:On 2-3 L NC rest,5 L NC w/activity1/29/21ECHO:Hyperdynamic LVSF.LVEF, 70 %.Mod. P. HTN, RVSP 44.8 mmHg.Trace-mild MV regurgitation. Chronic diastolic CHF1/29/21ECHO:Hyper   • CKD (chronic kidney disease) stage 3, GFR 30-59 ml/min (CMS/[facility]) 3/15/2019   • COPD (chronic obstructive pulmonary disease) with emphysema (CMS/[facility]) 2008 2008-mod.COPD 20% change w/ bronchodilator9/14/10.PFT-Sev. COPD/emphysema by Dr. Hallman. No hospitalizations   • COPD, severe (CMS/[facility])     2008-mod.COPD 20% change w/ bronchodilator9/14/10.PFT-Sev. COPD/emphysema by Dr. Hallman. Chronic Hypoxic respiratory failure,IGLESIAS 2/2 Sev. COPD On 2-3 L NC rest,5 L NC w/activity Mod. P. HTN,Chronic Cor Pulmonale:On 2-3 L NC rest,5 L NC w/activity1/29/21ECHO:Hyperdynamic LVSF.LVEF, 70 %.Mod. P. HTN, RVSP 44.8 mmHg.Trace-mild MV regurgitation.    • Diabetes mellitus, type 2 (CMS/[facility]) 12/25/06 2010 started on oral Rx5/29/12-A1C 6.7   • Diverticulosis of large intestine without  hemorrhage 1/25/2016   • DJD (degenerative joint disease), multiple sites 3/3/04    shoulders,hands, knees, feet-seen by Dr. Tomas 1/9//12   • Dysmetabolic syndrome X 2/12/2013   • Emphysema of lung (CMS/[facility])     9/14/10.PFT-Sev. COPD/emphysema by Dr. Hallman   • Emphysema of lung (CMS/[facility])     9/14/10.PFT-Sev. COPD/emphysema by Dr. Hallman Chronic Hypoxic respiratory failure,IGLESIAS 2/2 Sev. COPD On 2-3 L NC rest,5 L NC w/activity Mod. P. HTN,Chronic Cor Pulmonale:On 2-3 L NC rest,5 L NC w/activity1/29/21ECHO:Hyperdynamic LVSF.LVEF, 70 %.Mod. P. HTN, RVSP 44.8 mmHg.Trace-mild MV regurgitation.    • Erectile dysfunction 1/15/07   • Fracture     right finger, left foot   • Generalized osteoarthrosis, unspecified site 1/9/12    shoulders, hands, elbows, feet1/9/12 seen by Dr. Tomas   • GERD (gastroesophageal reflux disease) 12/12/95 12/12/95-EGD reflux esophagitis, erosive duodenitis by Dr. jacques   • Gout 1999    seen by Dr. Tomas 5/12   • H/O multiple pulmonary nodules 9/14/10    2/23/13. last CT scan1. Stable nodules bilaterally-right multiple, left single. A single new 6 mm nodule ,lft.12/2,/09Bronh-Dr. Hallman, chronic bronchitic changes.  Path- neg.   • History of 2019 novel coronavirus disease (COVID-19) 12/2/2020   • History of duodenal ulcer 10/31/08    10/31/08-EGD by Dr. CATHERINE Giron   • History of shortness of breath     9/14/10.PFT-Sev. COPD/emphysema by Dr. Hallman.9/4,/09-Cardiolite stress-N EF,,neg. echo: Mild LVH, DD,,N. EF   • [place] (hard of hearing)     lost hearing aides   • Hx Atrial flutter with rapid ventricular response/A.Fib. S/P ablation 5/27/2016    Parox. A. Fib./Flutter  S/P ablation with DCCV 6/18. On Sotalol, AC with warfarin,f/u w/ Dr. Mayo .1/29/21ECHO:Hyperdynamic LVSF.LVEF, 70 %.Mod. P. HTN, RVSP 44.8 mmHg.Trace-mild MV regurgitation.   • Hx Cellulitis and abscess of unspecified site 12/11/2013 12/11/13: Right elbow/upper extremity cellulitis    • Hx of colonic polyps 1/26/2016     Tubular/tubovillous adenoma ascending colon. Hyperplastic polyp rectum.    • Hydronephrosis, right     Mild to moderate hydronephrosis right kidney undetermined etiology, probably from chronic scarring due to radiation for neuroblastoma the right kidney in 1952..   • Hyperlipidemia LDL goal < 100 12/25/06   • Hyperparathyroidism, secondary renal (CMS/HCC)    • Hypertension 1/15/07   • Hypertensive renal disease    • Hyperuricemia     9/11/12, uric acid 3.5   • ASIYA (iron deficiency anemia) 1/10/2019   • Incomplete tear of left rotator cuff 6/12/2017   • Internal hemorrhoids     due to focal villous atrophy-by Dr. Polanco,.TC-IH o/w NEG.Dr. jacques   • Iron deficiency    • Left renal artery stenosis (CMS/HCC) 1/14/2020   • Long term (current) use of anticoagulants- on Coumadin 8/27/2019     Parox. A. fib. ablation with DCCV 6/18. On Sotalol, AC with warfarin,f/u w/ Dr. Mayo .1/29/21ECHO:Hyperdynamic LVSF.LVEF, 70 %.Mod. P. HTN, RVSP 44.8 mmHg.Trace-mild MV regurgitation.   • Lumbar disc herniation 6/18/2014    Significant disc disease at L4-L5. This includes a left paracentral disc extrusion which appears to contact the descending left L5 nerve root. There is also a left foraminal disc protrusion which contacts the exiting left L4 nerve root.    • Malignant neoplasm (CMS/HCC)    • Metabolic syndrome 12/25/06   • Moderate pulmonary hypertension (CMS/HCC) 2/1/2021    Chronic Hypoxic respiratory failure,IGLESIAS 2/2 Sev. COPD On 2-3 L NC rest,5 L NC w/activity Mod. P. HTN,Chronic Cor Pulmonale:On 2-3 L NC rest,5 L NC w/activity1/29/21ECHO:Hyperdynamic LVSF.LVEF, 70 %.Mod. P. HTN, RVSP 44.8 mmHg.Trace-mild MV regurgitation.     • Myofascial pain 6/12/2019   • Myopia of both eyes 1/24,/02   • Neuroblastoma (CMS/HCC) 1951    Diagnosed at age 8 months. Underwent radiation treatment [address] in 1952.   • Nonalcoholic fatty liver disease    • Osteoarthritis of left glenohumeral joint 6/12/2017   • Paroxysmal A-fib (CMS/HCC)    •  Paroxysmal atrial fibrillation (CMS/[facility])S/P Ablation 6/1/2018    Parox. A. fib. ablation with DCCV 6/18. On Sotalol, AC with warfarin,f/u w/ Dr. Mayo .1/29/21ECHO:Hyperdynamic LVSF.LVEF, 70 %.Mod. P. HTN, RVSP 44.8 mmHg.Trace-mild MV regurgitation.   • Personal history of neuroblastoma 1951    [id number], chronic scarring, US-multiple analysis mild to moderate hydronephrosis right kidney by Dr. Allison undetermined etiology, probably chronic scarring2/23/12CT Chest-Right renal atrophy, as well as multiple cysts.    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 Tomas 1/9/12-no features of lupus or scleroderma   • Presbyopia OU 1/24,/02   • Protein in urine 10/10/2016   • Proteinuria, unspecified    • Pulmonary hypertension with chronic cor pulmonale (CMS/[facility]) 2/1/2021    Chronic Hypoxic respiratory failure,IGLESIAS 2/2 Sev. COPD On 2-3 L NC rest,5 L NC w/activity Mod. P. HTN,Chronic Cor Pulmonale:On 2-3 L NC rest,5 L NC w/activity1/29/21ECHO:Hyperdynamic LVSF.LVEF, 70 %.Mod. P. HTN, RVSP 44.8 mmHg.Trace-mild MV regurgitation. Chronic diastolic CHF1/29/21ECHO:Hyperdynamic LVSF.LVEF, 70 %.Mod.P. HTN, RVS 44.8 mmHg.Trace-mild MV regurgitation.    • RAD (reactive airway disease) 2008    9/14/10.PFT-Sev. COPD/emphysema by Dr. Hallman2/22/12.  Mod-Sev.  Persistent asthma   • Solitary left kidney 1/14/2020    Non functioning right kidney due to multiple cysts and borderline elevated velocities within the left renal artery per imaging in 2015.    • Statin intolerance 7/22/2015   • Tendinopathy of left shoulder 6/12/2017   • Type 2 diabetes mellitus with stage 3 chronic kidney disease, without long-term current use of insulin (CMS/[facility]) 10/20/2016    11/09/20 A1c 7.2       Past Surgical History:   Procedure Laterality Date   • Ablation atrial fib - cv  06/18/2018   • Bronchoscopy  2009    Chronic bronchitic changes.  Pathology washings were negative, diagnosed to have COPD, emphysema, asthma and bronchiectasis Performed by Dr. Hallman   • Bronchoscopy,transbronch biopsy  10/27/2011    Normal upper airway examination. Mild to moderate acute bronchitic changes noted.& 12/09/2009   • Cardioversion  10/23/2018    [facility] AND [facility]    • Carpal tunnel release Right 11/03/2009    Right Carpal Tunnel-by Dr. CATHERINE Giron   • Cataract extraction extracapsular w/ intraocular lens implantation Left 11/12/2018    cataract extraction w/ IOL    • Colonoscopy  11/12/2001    Performed by Dr. Conte and was negative   • Colonoscopy  01/10/2019    8 polyps (tubular adenoma ×2. Hyperplastic x1). Transverse colon ulcer biopsied clinical significance is doubtful. Diverticulosis. Internal hemorrhoids. Mildly redundant colon. Poor prep. Recheck 1 year   • Colonoscopy  01/12/2021    Couple of benign polyps removed.  Moderate left colonic diverticulosis.  Moderate size nonbleeding internal hemorrhoids.  Very long redundant colon.  Otherwise normal colonoscopy.  Repeat colonoscopy with 2 days of clear liquid diet and 2 days of bowel prep in 1  year.   • Colonoscopy diagnostic  02/12/2007    due to focal villous atrophy-by Dr. Polanco,.TC-IH o/w NEG.Dr. jacques   • Colonoscopy w/ polypectomy  1/25/2016    tubular adenoma and hyperplastic polyp. Next TC in 3 years.   • Colonoscopy w/ polypectomy  01/12/2021    Dr. Maikel Foy, Recall in 1 year, Total of 9 polyps removed, diverticulosis, internal hemorrhoids,   • Echo heart resting w doppler & color flow  08/30/2019   • Echocardiogram  9/2009    Mild LVH, impaired diastolic filling, normal ejection fraction.   • Esophagogastroduodenoscopy  12/12/1995    Reflux esophagitis and erosive duodenitis. Biopsy was negative.   • Esophagogastroduodenoscopy  10/31/2008    Duodenal ulcer   • Esophagogastroduodenoscopy  01/10/2019    GERD with no esophagitis. Rule out Rueda's. Mild erosive gastritis. Biopsy nonspecific chronic  inflammation and reactive epithelial changes.   • Fracture surgery      knee surgery   • Knee arthroscopy w/ synovectomy Right 2011    right knee, arthroscopic meniscectomysecondary to ACL injury, lateral meniscus tear,OA by Dr. Carty   • Nm myocard perf/rest & stress  2009    55% ejection fraction otherwise no significant changes.   • Nm myocard perf/rest & stress  2017    Normal perfusion   • Pulmonary function test  2010    Severe COPD suggesting emphysema.   • Tonsillectomy and adenoidectomy  1964    as child       Family History:  Family History   Problem Relation Age of Onset   • Coronary Artery Disease Mother    • Hypertension Mother    • Dementia/Alzheimers Mother    • Cancer Father         Brain Tumor   • Patient is unaware of any medical problems Sister    • Patient is unaware of any medical problems Sister    • Patient is unaware of any medical problems Daughter    • Patient is unaware of any medical problems Son        Social History:  Social History     Tobacco Use   • Smoking status: Former Smoker     Packs/day: 2.00     Years: 44.00     Pack years: 88.00     Start date: 1965     Quit date: 2009     Years since quittin.2   • Smokeless tobacco: Never Used   Substance Use Topics   • Alcohol use: Yes     Comment: 1-2 glass of rum and coke a night, last drink was last week       REVIEW OF SYSTEMS     Review of Systems   Constitutional: Negative for activity change and fatigue.   Respiratory: Negative for chest tightness and shortness of breath.         Continuous O2   Cardiovascular: Negative for chest pain, palpitations and leg swelling.   Neurological: Negative for dizziness, syncope and light-headedness.   All other systems reviewed and are negative.      PHYSICAL EXAM     Physical Exam       The patient is awake alert oriented and in no apparent distress. Appears well developed, oriented x3.     VITAL SIGNS: Vital signs are obtained and are documented in electronic  medical records.     HEENT  Examination of the head and neck reveals no jugular venous distention. Pupils are equal and reactive.  Thyroid is normal.  Ears normal.   No obvious cervical lymphadenopathy.  Oropharynx normal.    HEART:  The apex beat is palpable in the 5th interspace, anterior axillary line. Normal in character. The first heart sound is normal in intensity. The second heart sound is normally split. P2 is loud. Systolic murmurs heard at A2 and P2 areas.  S4 is present. No rubs auscultated.    LUNGS:  Lungs are clear to auscultation and percussion. No rales are heard. The breath sounds are equal bilaterally and equal expansion is noted. Breath sounds are decreased overall.    ABDOMEN:  The abdomen is essentially benign. There is no hepatomegaly or tenderness in any part of the abdomen. Bowel sounds are present. No masses are felt. No ascites noted. Spleen is not palpable.     EXTREMITIES:  The extremities appear essentially normal. Pulses are equal but reduced.There is no peripheral edema.    CENTRAL NERVOUS SYSTEM:  Alert and oriented X3.  Cranial nerves 2-12 are intact. Reflexes appear normal. There is equal strength on both sides in both upper and lower limbs.    SKIN, BONES/JOINT:  No significant abnormality detected. Skin color is normal, warm and dry.   The patient is on supplemental oxygen        TSH (mcUnits/mL)   Date Value   12/02/2020 0.488     WBC (K/mcL)   Date Value   07/07/2021 6.8     RBC (mil/mcL)   Date Value   07/07/2021 3.22 (L)     HCT (%)   Date Value   07/07/2021 30.5 (L)     HGB (g/dL)   Date Value   07/07/2021 9.0 (L)     PLT (K/mcL)   Date Value   07/07/2021 221     Sodium (mmol/L)   Date Value   07/07/2021 141     Potassium (mmol/L)   Date Value   07/07/2021 5.1     Chloride (mmol/L)   Date Value   07/07/2021 104     Glucose (mg/dL)   Date Value   07/07/2021 234 (H)     Calcium (mg/dL)   Date Value   07/07/2021 8.7     Carbon Dioxide (mmol/L)   Date Value   07/07/2021 32      BUN (mg/dL)   Date Value   07/07/2021 49 (H)     Creatinine (mg/dL)   Date Value   07/07/2021 2.10 (H)     Ejection Fraction   Date Value Ref Range Status   06/04/2021 65.0 % Final     INR (no units)   Date Value   08/19/2021 2.8     Telemetry: (Reviewed personally by Dr. Mayo): NSR rate of 84 bpm. Qtc 470 ms  ASSESSMENT/PLAN   Paroxysmal atrial fibrillation  Status post ablation      Discussion  The patient is doing very well and maintaining sinus rhythm on low-dose sotalol.  Today's EKG demonstrates modest drug effect in normal sinus rhythm.  We will continue present treatment.    Plan  1. No changes in medications at this time. Continue the same.  2. Tele follow up in 6 months with EKG one week prior    On 9/21/2021, Hillary LACY RN scribed the services personally performed by Divya Mayo MD